# Patient Record
Sex: FEMALE | Race: BLACK OR AFRICAN AMERICAN | Employment: OTHER | URBAN - METROPOLITAN AREA
[De-identification: names, ages, dates, MRNs, and addresses within clinical notes are randomized per-mention and may not be internally consistent; named-entity substitution may affect disease eponyms.]

---

## 2018-03-16 ENCOUNTER — OFFICE VISIT (OUTPATIENT)
Dept: INTERNAL MEDICINE CLINIC | Age: 68
End: 2018-03-16

## 2018-03-16 VITALS
HEIGHT: 65 IN | RESPIRATION RATE: 18 BRPM | SYSTOLIC BLOOD PRESSURE: 153 MMHG | BODY MASS INDEX: 23.04 KG/M2 | HEART RATE: 111 BPM | OXYGEN SATURATION: 97 % | DIASTOLIC BLOOD PRESSURE: 79 MMHG | WEIGHT: 138.3 LBS | TEMPERATURE: 98.5 F

## 2018-03-16 DIAGNOSIS — E78.5 DYSLIPIDEMIA: ICD-10-CM

## 2018-03-16 DIAGNOSIS — I10 ESSENTIAL HYPERTENSION: Primary | ICD-10-CM

## 2018-03-16 DIAGNOSIS — E11.9 CONTROLLED TYPE 2 DIABETES MELLITUS WITHOUT COMPLICATION, WITHOUT LONG-TERM CURRENT USE OF INSULIN (HCC): ICD-10-CM

## 2018-03-16 DIAGNOSIS — R00.0 SINUS TACHYCARDIA: ICD-10-CM

## 2018-03-16 DIAGNOSIS — I34.1 MITRAL VALVE PROLAPSE: ICD-10-CM

## 2018-03-16 RX ORDER — GLUCOSAMINE SULFATE 1500 MG
1000 POWDER IN PACKET (EA) ORAL DAILY
COMMUNITY

## 2018-03-16 RX ORDER — LOSARTAN POTASSIUM AND HYDROCHLOROTHIAZIDE 12.5; 5 MG/1; MG/1
1 TABLET ORAL DAILY
Qty: 30 TAB | Refills: 11 | Status: SHIPPED | OUTPATIENT
Start: 2018-03-16 | End: 2019-04-25 | Stop reason: CLARIF

## 2018-03-16 RX ORDER — SIMVASTATIN 80 MG/1
80 TABLET, FILM COATED ORAL
COMMUNITY
End: 2018-03-16 | Stop reason: SDUPTHER

## 2018-03-16 RX ORDER — NIFEDIPINE 90 MG/1
90 TABLET, FILM COATED, EXTENDED RELEASE ORAL DAILY
COMMUNITY
End: 2018-03-16 | Stop reason: SDUPTHER

## 2018-03-16 RX ORDER — NIFEDIPINE 90 MG/1
90 TABLET, FILM COATED, EXTENDED RELEASE ORAL DAILY
Qty: 30 TAB | Refills: 11 | Status: SHIPPED | OUTPATIENT
Start: 2018-03-16 | End: 2019-04-10 | Stop reason: SDUPTHER

## 2018-03-16 RX ORDER — SIMVASTATIN 80 MG/1
80 TABLET, FILM COATED ORAL
Qty: 30 TAB | Refills: 11 | Status: SHIPPED | OUTPATIENT
Start: 2018-03-16 | End: 2019-05-16 | Stop reason: SDUPTHER

## 2018-03-16 RX ORDER — METFORMIN HYDROCHLORIDE 500 MG/1
500 TABLET ORAL 2 TIMES DAILY WITH MEALS
Qty: 60 TAB | Refills: 11 | Status: SHIPPED | OUTPATIENT
Start: 2018-03-16 | End: 2019-06-28 | Stop reason: CLARIF

## 2018-03-16 RX ORDER — ASPIRIN 81 MG/1
TABLET ORAL DAILY
COMMUNITY
End: 2019-11-24 | Stop reason: CLARIF

## 2018-03-16 RX ORDER — METFORMIN HYDROCHLORIDE 500 MG/1
500 TABLET ORAL 2 TIMES DAILY WITH MEALS
COMMUNITY
End: 2018-03-16 | Stop reason: SDUPTHER

## 2018-03-16 NOTE — PROGRESS NOTES
[address] [place] [address] and Primary Care  [place] [address]  [address] [facility] [address]  Phone:  [phone]  Fax: [phone]       Chief Complaint   Patient presents with    New Patient     Patient recently moved from [place], [place]. Hx of diabetes   . SUBJECTIVE:    Mary Farmer is a 79 y.o. female She comes in as a new patient. She has had hypertension since 2010. She has had no cardiovascular complications. She was diagnosed with diabetes roughly in 2010 also. Last hemoglobin A1C was less than 7. On the times when she did check blood sugars, they have been generally less than 125. She has been maintained on Metformin. She has never had a weight problem, but the maximum weight sustained being in the 150 range. Most recently, she was told that she had mitral valve prolapse. She stays quite physically active. Current Outpatient Prescriptions   Medication Sig Dispense Refill    aspirin delayed-release 81 mg tablet Take  by mouth daily.  cholecalciferol (VITAMIN D3) 1,000 unit cap Take 1,000 Units by mouth daily.  metFORMIN (GLUCOPHAGE) 500 mg tablet Take 1 Tab by mouth two (2) times daily (with meals). 60 Tab 11    NIFEdipine ER (ADALAT CC) 90 mg ER tablet Take 1 Tab by mouth daily. 30 Tab 11    simvastatin (ZOCOR) 80 mg tablet Take 1 Tab by mouth nightly. 30 Tab 11    losartan-hydroCHLOROthiazide (HYZAAR) 50-12.5 mg per tablet Take 1 Tab by mouth daily.  27 Tab 11     Past Medical History:   Diagnosis Date    Diabetes ([address]     [facility]    Hypercholesterolemia     Hypertension      Past Surgical History:   Procedure Laterality Date    BREAST SURGERY PROCEDURE UNLISTED Right 1998    Status post breast biopsy--- benign    [facility]  [facility]    [facility]     Allergies   Allergen Reactions    Lisinopril Swelling    Iodine Rash         REVIEW OF SYSTEMS:  General: negative for - chills or fever  ENT: negative for - headaches, nasal congestion or tinnitus  Respiratory: negative for - cough, hemoptysis, shortness of breath or wheezing  Cardiovascular : negative for - chest pain, edema, palpitations or shortness of breath  Gastrointestinal: negative for - abdominal pain, blood in stools, heartburn or nausea/vomiting  Genito-Urinary: no dysuria, trouble voiding, or hematuria  Musculoskeletal: negative for - gait disturbance, joint pain, joint stiffness or joint swelling  Neurological: no TIA or stroke symptoms  Hematologic: no bruises, no bleeding, no swollen glands  Integument: no lumps, mole changes, nail changes or rash  Endocrine: no malaise/lethargy or unexpected weight changes      Social History     Social History    Marital status: UNKNOWN     Spouse name: N/A    Number of children: N/A    Years of education: N/A     Social History Main Topics    Smoking status: Never Smoker    Smokeless tobacco: Never Used    Alcohol use Yes      Comment: socially    Drug use: No    Sexual activity: Yes     Partners: Male     Other Topics Concern    None     Social History Narrative     Family History   Problem Relation Age of Onset    Heart Disease Mother     Glaucoma Father        OBJECTIVE:    Visit Vitals    /79 (BP 1 Location: Left arm, BP Patient Position: Sitting)    Pulse (!) 111    Temp 98.5 °F (36.9 °C) (Oral)    Resp 18    Ht 5' 5\" (1.651 m)    Wt 138 lb 4.8 oz (62.7 kg)    SpO2 97%    BMI 23.01 kg/m2     CONSTITUTIONAL: well , well nourished, appears age appropriate  EYES: perrla, eom intact  ENMT:moist mucous membranes, pharynx clear  NECK: supple.  Thyroid normal, no bruits  RESPIRATORY: Chest: clear to ascultation and percussion   CARDIOVASCULAR: Heart: regular rate and rhythm, 1/6 systolic ejection murmur  GASTROINTESTINAL: Abdomen: soft, bowel sounds active  HEMATOLOGIC: no pathological lymph nodes palpated  MUSCULOSKELETAL: Extremities: no edema, pulse 1+   INTEGUMENT: No unusual rashes or suspicious skin lesions noted. Nails appear normal.  NEUROLOGIC: non-focal exam   MENTAL STATUS: alert and oriented, appropriate affect      ASSESSMENT:  1. Essential hypertension    2. Controlled type 2 diabetes mellitus without complication, without long-term current use of insulin ([facility]    3. Dyslipidemia    4. Mitral valve prolapse    5. Sinus tachycardia      Diagnoses and all orders for this visit:    1. Essential hypertension  -     NIFEdipine ER (ADALAT CC) 90 mg ER tablet; Take 1 Tab by mouth daily.  -     AMB POC EKG ROUTINE W/ 12 LEADS, INTER & REP  -     losartan-hydroCHLOROthiazide (HYZAAR) 50-12.5 mg per tablet; Take 1 Tab by mouth daily.  -     CBC WITH AUTOMATED DIFF  -     COLLECTION VENOUS BLOOD,VENIPUNCTURE  -     METABOLIC PANEL, COMPREHENSIVE  -     URINALYSIS W/ RFLX MICROSCOPIC    2. Controlled type 2 diabetes mellitus without complication, without long-term current use of insulin ([facility])  Assessment & Plan:  Stable, based on history, physical exam and review of pertinent labs, studies and medications; meds reconciled; lifestyle modifications recommended. Key Antihyperglycemic Medications             metFORMIN (GLUCOPHAGE) 500 mg tablet  (Taking) Take 1 Tab by mouth two (2) times daily (with meals). Other Key Diabetic Medications             simvastatin (ZOCOR) 80 mg tablet  (Taking) Take 1 Tab by mouth nightly. losartan-hydroCHLOROthiazide (HYZAAR) 50-12.5 mg per tablet  (Taking) Take 1 Tab by mouth daily. No results found for: HBA1C, LPX7XXAX, DOM8QADA, GLU, CREA, CREAPOC, CREATEXT, MALBUR, MCALPOCT, MCACRPOC, MALBCRRATEXT, MCACR, MCAU, MCAU2, MALBEXT, CHOL, CHOLPOCT, HDL, HDLPOC, LDLC, LDL, LDLCEXT, LDLCPOC, TRIGL, TGLPOCT, GEE4FNDH  Diabetic Foot and Eye Exam HM Status   Topic Date Due    Diabetic Foot Care  10/02/1960    Eye Exam  10/02/1960       Orders:  -     metFORMIN (GLUCOPHAGE) 500 mg tablet;  Take 1 Tab by mouth two (2) times daily (with meals). -     HEMOGLOBIN A1C WITH EAG  -     MICROALBUMIN, UR, RAND W/ MICROALB/CREAT RATIO    3. Dyslipidemia  -     simvastatin (ZOCOR) 80 mg tablet; Take 1 Tab by mouth nightly. -     APOLIPOPROTEIN B  -     LIPID PANEL  -     TSH 3RD GENERATION    4. Mitral valve prolapse    5. Sinus tachycardia        PLAN:    1. The patient's blood pressure is 148/80. I will make no adjustments since this is her first visit. She does have a rather impressive sinus tachycardia. This is probably a compensatory tachycardia related to use of the vasodilator in conjunction with increased sympathetic tone related to being anxious. She was told to get a blood pressure cuff, electronic, which will read not only her blood pressure, but her pulse rate and to write these down every other night. I will make a determination as to her actual blood pressure readings, as well as the degree of sympathetic tone that exists. 2. She is a diabetic and I will await the results of her hemoglobin A1C. No adjustments will be made. 3. She has history of hypercholesterolemia and is on Simvastatin 80 mg. This was obviously started years ago. No adjustment for now. 4. She does have a history of mitral valve prolapse via echocardiogram.  I will obtain the results of this study.         .  Orders Placed This Encounter    APOLIPOPROTEIN B    CBC WITH AUTOMATED DIFF    LIPID PANEL    METABOLIC PANEL, COMPREHENSIVE    TSH 3RD GENERATION    URINALYSIS W/ RFLX MICROSCOPIC    HEMOGLOBIN A1C WITH EAG    MICROALBUMIN, UR, RAND W/ MICROALB/CREAT RATIO    AMB POC EKG ROUTINE W/ 12 LEADS, INTER & REP    DISCONTD: metFORMIN (GLUCOPHAGE) 500 mg tablet    DISCONTD: simvastatin (ZOCOR) 80 mg tablet    DISCONTD: NIFEdipine ER (ADALAT CC) 90 mg ER tablet    DISCONTD: losartan 50 mg tab 50 mg, hydroCHLOROthiazide 12.5 mg cap 12.5 mg    aspirin delayed-release 81 mg tablet    cholecalciferol (VITAMIN D3) 1,000 unit cap    metFORMIN (GLUCOPHAGE) 500 mg tablet    NIFEdipine ER (ADALAT CC) 90 mg ER tablet    simvastatin (ZOCOR) 80 mg tablet    losartan-hydroCHLOROthiazide (HYZAAR) 50-12.5 mg per tablet         Follow-up Disposition:  Return in about 4 weeks (around 4/13/2018).       Oz Mcadams MD

## 2018-03-16 NOTE — PROGRESS NOTES
Chief Complaint   Patient presents with    New Patient     Patient recently moved from [place], [place]. Hx of diabetes     1. Have you been to the ER, urgent care clinic since your last visit? Hospitalized since your last visit? No    2. Have you seen or consulted any other health care providers outside of the [address] [place] [address] since your last visit? Include any pap smears or colon screening.  No\

## 2018-03-16 NOTE — MR AVS SNAPSHOT
[address] [place] [address] [place] [address] 
[phone] Patient: Mary Farmer MRN: [id number] ODE:91/7/2240 Visit Information Date & Time Provider Department Dept. Phone Encounter #  
 3/16/2018 11:00 AM [facility] [id number] Upcoming Health Maintenance Date Due Hepatitis C Screening 1950 DTaP/Tdap/Td series (1 - Tdap) 10/2/1971 BREAST CANCER SCRN MAMMOGRAM 10/2/2000 FOBT Q 1 YEAR AGE 50-75 10/2/2000 ZOSTER VACCINE AGE 60> 8/2/2010 GLAUCOMA SCREENING Q2Y 10/2/2015 Bone Densitometry (Dexa) Screening 10/2/2015 Pneumococcal 65+ Low/Medium Risk (1 of 2 - PCV13) 10/2/2015 MEDICARE YEARLY EXAM 10/2/2015 Influenza Age 5 to Adult 8/1/2017 Allergies as of 3/16/2018  Review Complete On: 3/16/2018 By: Del Tucker Severity Noted Reaction Type Reactions Lisinopril High 03/16/2018    Swelling Iodine  03/16/2018    Rash Current Immunizations  Never Reviewed No immunizations on file. Not reviewed this visit You Were Diagnosed With   
  
 Codes Comments Essential hypertension    -  Primary ICD-10-CM: I10 
ICD-9-CM: 401.9 Controlled type 2 diabetes mellitus without complication, without long-term current use of insulin ([facility]     ICD-10-CM: E11.9 ICD-9-CM: 250.00 Dyslipidemia     ICD-10-CM: E78.5 ICD-9-CM: 272.4 Mitral valve prolapse     ICD-10-CM: I34.1 ICD-9-CM: 424.0 Sinus tachycardia     ICD-10-CM: R00.0 ICD-9-CM: 427.89 Vitals BP Pulse Temp Resp Height(growth percentile) Weight(growth percentile) 153/79 (BP 1 Location: Left arm, BP Patient Position: Sitting) (!) 111 98.5 °F (36.9 °C) (Oral) 18 5' 5\" (1.651 m) 138 lb 4.8 oz (62.7 kg) SpO2 BMI OB Status Smoking Status 97% 23.01 kg/m2 Postmenopausal Never Smoker BMI and BSA Data Body Mass Index Body Surface Area 23.01 kg/m 2 1.7 m 2 [facility] Your Updated Medication List  
  
   
This list is accurate as of 3/16/18  2:21 PM.  Always use your most recent med list.  
  
  
  
  
 aspirin delayed-release 81 mg tablet Take  by mouth daily. losartan-hydroCHLOROthiazide 50-12.5 mg per tablet Commonly known as:  HYZAAR Take 1 Tab by mouth daily. metFORMIN 500 mg tablet Commonly known as:  GLUCOPHAGE Take 1 Tab by mouth two (2) times daily (with meals). NIFEdipine ER 90 mg ER tablet Commonly known as:  ADALAT CC Take 1 Tab by mouth daily. simvastatin 80 mg tablet Commonly known as:  ZOCOR Take 1 Tab by mouth nightly. VITAMIN D3 1,000 unit Cap Generic drug:  cholecalciferol Take 1,000 Units by mouth daily. Prescriptions Sent to Pharmacy Refills  
 metFORMIN (GLUCOPHAGE) 500 mg tablet 11 Sig: Take 1 Tab by mouth two (2) times daily (with meals). Class: Normal  
 Pharmacy: [place] [address]. Ph #: [phone] Route: Oral  
 NIFEdipine ER (ADALAT CC) 90 mg ER tablet 11 Sig: Take 1 Tab by mouth daily. Class: Normal  
 Pharmacy: [place] [address]. Ph #: [phone] Route: Oral  
 simvastatin (ZOCOR) 80 mg tablet 11 Sig: Take 1 Tab by mouth nightly. Class: Normal  
 Pharmacy: [place] [address]. Ph #: [phone] Route: Oral  
 losartan-hydroCHLOROthiazide (HYZAAR) 50-12.5 mg per tablet 11 Sig: Take 1 Tab by mouth daily. Class: Normal  
 Pharmacy: [place] [address]. Ph #: [phone]  Route: Oral  
  
 We Performed the Following AMB POC EKG ROUTINE W/ 12 LEADS, INTER & REP [40569 CPT(R)] APOLIPOPROTEIN B [id number] CPT(R)] CBC WITH AUTOMATED DIFF [14815 CPT(R)] COLLECTION VENOUS BLOOD,VENIPUNCTURE [id number] CPT(R)] HEMOGLOBIN A1C WITH EAG [67501 CPT(R)] LIPID PANEL [77114 CPT(R)] METABOLIC PANEL, COMPREHENSIVE [19313 CPT(R)] MICROALBUMIN, UR, RAND W/ MICROALB/CREAT RATIO [id number] CPT(R)] TSH 3RD GENERATION [10606 CPT(R)] URINALYSIS W/ RFLX MICROSCOPIC [08789 CPT(R)] Introducing [facility] & HEALTH SERVICES! Bogdan Narayan introduces [employer]Street patient portal. Now you can access parts of your medical record, email your doctor's office, and request medication refills online. 1. In your internet browser, go to [email or web address] 2. Click on the First Time User? Click Here link in the Sign In box. You will see the New Member Sign Up page. 3. Enter your [employer] Access Code exactly as it appears below. You will not need to use this code after youve completed the sign-up process. If you do not sign up before the expiration date, you must request a new code. · [employer] Access Code: [email or web address] Expires: 6/14/2018  2:21 PM 
 
4. Enter the last four digits of your Social Security Number (xxxx) and Date of Birth (mm/dd/yyyy) as indicated and click Submit. You will be taken to the next sign-up page. 5. Create a WomStreet ID. This will be your W[employer]Street login ID and cannot be changed, so think of one that is secure and easy to remember. 6. Create a W[employer]Street password. You can change your password at any time. 7. Enter your Password Reset Question and Answer. This can be used at a later time if you forget your password. 8. Enter your e-mail address. You will receive e-mail notification when new information is available in [address] 9. Click Sign Up. You can now view and download portions of your medical record. 10. Click the Download Summary menu link to download a portable copy of your medical information. If you have questions, please visit the Frequently Asked Questions section of the [employer] website. Remember, [employer] is NOT to be used for urgent needs. For medical emergencies, dial 911. Now available from your iPhone and Android! Please provide this summary of care documentation to your next provider. If you have any questions after today's visit, please call [phone].

## 2018-03-17 LAB
ALBUMIN SERPL-MCNC: 4.7 G/DL (ref 3.6–4.8)
ALBUMIN/CREAT UR: 265.5 MG/G CREAT (ref 0–30)
ALBUMIN/GLOB SERPL: 1.7 {RATIO} (ref 1.2–2.2)
ALP SERPL-CCNC: 42 IU/L (ref 39–117)
ALT SERPL-CCNC: 10 IU/L (ref 0–32)
APO B SERPL-MCNC: 67 MG/DL (ref 54–133)
APPEARANCE UR: CLEAR
AST SERPL-CCNC: 17 IU/L (ref 0–40)
BASOPHILS # BLD AUTO: 0 X10E3/UL (ref 0–0.2)
BASOPHILS NFR BLD AUTO: 1 %
BILIRUB SERPL-MCNC: 0.6 MG/DL (ref 0–1.2)
BILIRUB UR QL STRIP: NEGATIVE
BUN SERPL-MCNC: 10 MG/DL (ref 8–27)
BUN/CREAT SERPL: 15 (ref 12–28)
CALCIUM SERPL-MCNC: 9.9 MG/DL (ref 8.7–10.3)
CHLORIDE SERPL-SCNC: 99 MMOL/L (ref 96–106)
CHOLEST SERPL-MCNC: 149 MG/DL (ref 100–199)
CO2 SERPL-SCNC: 25 MMOL/L (ref 18–29)
COLOR UR: YELLOW
CREAT SERPL-MCNC: 0.67 MG/DL (ref 0.57–1)
CREAT UR-MCNC: 20.6 MG/DL
EOSINOPHIL # BLD AUTO: 0.1 X10E3/UL (ref 0–0.4)
EOSINOPHIL NFR BLD AUTO: 1 %
ERYTHROCYTE [DISTWIDTH] IN BLOOD BY AUTOMATED COUNT: 18.4 % (ref 12.3–15.4)
EST. AVERAGE GLUCOSE BLD GHB EST-MCNC: 143 MG/DL
GFR SERPLBLD CREATININE-BSD FMLA CKD-EPI: 105 ML/MIN/1.73
GFR SERPLBLD CREATININE-BSD FMLA CKD-EPI: 91 ML/MIN/1.73
GLOBULIN SER CALC-MCNC: 2.7 G/DL (ref 1.5–4.5)
GLUCOSE SERPL-MCNC: 91 MG/DL (ref 65–99)
GLUCOSE UR QL: NEGATIVE
HBA1C MFR BLD: 6.6 % (ref 4.8–5.6)
HCT VFR BLD AUTO: 31.7 % (ref 34–46.6)
HDLC SERPL-MCNC: 73 MG/DL
HGB BLD-MCNC: 9.6 G/DL (ref 11.1–15.9)
HGB UR QL STRIP: NEGATIVE
IMM GRANULOCYTES # BLD: 0 X10E3/UL (ref 0–0.1)
IMM GRANULOCYTES NFR BLD: 0 %
KETONES UR QL STRIP: NEGATIVE
LDLC SERPL CALC-MCNC: 64 MG/DL (ref 0–99)
LEUKOCYTE ESTERASE UR QL STRIP: NEGATIVE
LYMPHOCYTES # BLD AUTO: 2.5 X10E3/UL (ref 0.7–3.1)
LYMPHOCYTES NFR BLD AUTO: 33 %
MCH RBC QN AUTO: 22.1 PG (ref 26.6–33)
MCHC RBC AUTO-ENTMCNC: 30.3 G/DL (ref 31.5–35.7)
MCV RBC AUTO: 73 FL (ref 79–97)
MICRO URNS: NORMAL
MICROALBUMIN UR-MCNC: 54.7 UG/ML
MONOCYTES # BLD AUTO: 0.6 X10E3/UL (ref 0.1–0.9)
MONOCYTES NFR BLD AUTO: 8 %
NEUTROPHILS # BLD AUTO: 4.3 X10E3/UL (ref 1.4–7)
NEUTROPHILS NFR BLD AUTO: 57 %
NITRITE UR QL STRIP: NEGATIVE
PH UR STRIP: 6.5 [PH] (ref 5–7.5)
PLATELET # BLD AUTO: 499 X10E3/UL (ref 150–379)
POTASSIUM SERPL-SCNC: 4.1 MMOL/L (ref 3.5–5.2)
PROT SERPL-MCNC: 7.4 G/DL (ref 6–8.5)
PROT UR QL STRIP: NEGATIVE
RBC # BLD AUTO: 4.35 X10E6/UL (ref 3.77–5.28)
SODIUM SERPL-SCNC: 143 MMOL/L (ref 134–144)
SP GR UR: 1.01 (ref 1–1.03)
TRIGL SERPL-MCNC: 59 MG/DL (ref 0–149)
TSH SERPL DL<=0.005 MIU/L-ACNC: 0.9 UIU/ML (ref 0.45–4.5)
UROBILINOGEN UR STRIP-MCNC: 0.2 MG/DL (ref 0.2–1)
VLDLC SERPL CALC-MCNC: 12 MG/DL (ref 5–40)
WBC # BLD AUTO: 7.5 X10E3/UL (ref 3.4–10.8)

## 2018-03-19 ENCOUNTER — TELEPHONE (OUTPATIENT)
Dept: INTERNAL MEDICINE CLINIC | Age: 68
End: 2018-03-19

## 2018-03-19 NOTE — TELEPHONE ENCOUNTER
Per Dr. Zev Jordan patient notified by phone and ask to return for some additional labs because of low hemoglobin. Dr. Zev Jordan asking that she return for a TIBC, FERRITIN, SPE,FRACTIONATED HEMOGLOBIN,AND REPEAT CBC. Patient states she live in [place] and she will have to fin a ride. She will call me when she can come in.

## 2018-03-23 ENCOUNTER — LAB ONLY (OUTPATIENT)
Dept: INTERNAL MEDICINE CLINIC | Age: 68
End: 2018-03-23

## 2018-03-23 DIAGNOSIS — D64.9 ANEMIA, UNSPECIFIED TYPE: Primary | ICD-10-CM

## 2018-03-23 NOTE — MR AVS SNAPSHOT
[facility] 
 
 
 [place] [address] [address] 
[phone] Patient: Mary Farmer MRN: [id number] TRN:14/7/[birth year]473 Visit Information Date & Time Provider Department Dept. Phone Encounter #  
 3/23/2018 11:00 AM [facility] [address] [place] [address] [phone] [id number] Your Appointments 4/20/2018 12:00 PM  
Any with Oz Mcadams MD  
[address] [place] [address] and Primary Care [facility]) Appt Note: 1 month follow up  
 [place] [address] [facility]  
  
   
 [place] [address] Upcoming Health Maintenance Date Due Hepatitis C Screening 1950 FOOT EXAM Q1 10/2/1960 EYE EXAM RETINAL OR DILATED Q1 10/2/1960 DTaP/Tdap/Td series (1 - Tdap) 10/2/1971 BREAST CANCER SCRN MAMMOGRAM 10/2/2000 FOBT Q 1 YEAR AGE 50-75 10/2/2000 ZOSTER VACCINE AGE 60> 8/2/2010 GLAUCOMA SCREENING Q2Y 10/2/2015 Bone Densitometry (Dexa) Screening 10/2/2015 Pneumococcal 65+ Low/Medium Risk (1 of 2 - PCV13) 10/2/2015 Influenza Age 5 to Adult 8/1/2017 HEMOGLOBIN A1C Q6M 9/16/2018 MICROALBUMIN Q1 3/16/2019 LIPID PANEL Q1 3/16/2019 Allergies as of 3/23/2018  Review Complete On: 3/17/2018 By: Oz Mcadams MD  
  
 Severity Noted Reaction Type Reactions Lisinopril High 03/16/2018    Swelling Iodine  03/16/2018    Rash Current Immunizations  Never Reviewed No immunizations on file. Not reviewed this visit You Were Diagnosed With   
  
 Codes Comments Anemia, unspecified type    -  Primary ICD-10-CM: D64.9 ICD-9-CM: 151. 9 Vitals OB Status Smoking Status Postmenopausal Never Smoker Preferred Pharmacy Pharmacy Name Phone [facility], [place] [address] Your Updated Medication List  
  
   
 This list is accurate as of 3/23/18  1:23 PM.  Always use your most recent med list.  
  
  
  
  
 aspirin delayed-release 81 mg tablet Take  by mouth daily. losartan-hydroCHLOROthiazide 50-12.5 mg per tablet Commonly known as:  HYZAAR Take 1 Tab by mouth daily. metFORMIN 500 mg tablet Commonly known as:  GLUCOPHAGE Take 1 Tab by mouth two (2) times daily (with meals). NIFEdipine ER 90 mg ER tablet Commonly known as:  ADALAT CC Take 1 Tab by mouth daily. simvastatin 80 mg tablet Commonly known as:  ZOCOR Take 1 Tab by mouth nightly. VITAMIN D3 1,000 unit Cap Generic drug:  cholecalciferol Take 1,000 Units by mouth daily. We Performed the Following CBC WITH AUTOMATED DIFF [01062 CPT(R)] Comments:  
 Per providers verbal orders FERRITIN [69311 CPT(R)] Comments:  
 Per providers verbal orders HEMOGLOBIN FRACTIONATION [CFZ32013 Custom] Comments:  
 Per providers verbal orders IRON PROFILE [id number] CPT(R)] Comments:  
 Per providers verbal orders PROTEIN ELECTROPHORESIS [36279 CPT(R)] Comments:  
 Per providers verbal orders Introducing \[facility]\"" & HEALTH SERVICES! [facility] introduces [employer]ia patient portal. Now you can access parts of your medical record, email your doctor's office, and request medication refills online. 1. In your internet browser, go to [email or web address] "[email or web address]/[email or web address] 2. Click on the First Time User? Click Here link in the Sign In box. You will see the New Member Sign Up page. 3. Enter your Xuanyixia Access Code exactly as it appears below. You will not need to use this code after youve completed the sign-up process. If you do not sign up before the expiration date, you must request a new code. · Xuany[employer]ia Access Code: [email or web address] Expires: 6/14/2018  2:21 PM 
 
4.  Enter the last four digits of your Social Security Number (xxxx) and Date of Birth (mm/dd/yyyy) as indicated and click Submit. You will be taken to the next sign-up page. 5. Create a Minded ID. This will be your Minded login ID and cannot be changed, so think of one that is secure and easy to remember. 6. Create a Minded password. You can change your password at any time. 7. Enter your Password Reset Question and Answer. This can be used at a later time if you forget your password. 8. Enter your e-mail address. You will receive e-mail notification when new information is available in [address] 9. Click Sign Up. You can now view and download portions of your medical record. 10. Click the Download Summary menu link to download a portable copy of your medical information. If you have questions, please visit the Frequently Asked Questions section of the Minded website. Remember, Minded is NOT to be used for urgent needs. For medical emergencies, dial 911. Now available from your iPhone and Android! Please provide this summary of care documentation to your next provider. If you have any questions after today's visit, please call [phone].

## 2018-03-27 LAB
ALBUMIN SERPL ELPH-MCNC: 4.2 G/DL (ref 2.9–4.4)
ALBUMIN/GLOB SERPL: 1.2 {RATIO} (ref 0.7–1.7)
ALPHA1 GLOB SERPL ELPH-MCNC: 0.2 G/DL (ref 0–0.4)
ALPHA2 GLOB SERPL ELPH-MCNC: 0.9 G/DL (ref 0.4–1)
B-GLOBULIN SERPL ELPH-MCNC: 1.2 G/DL (ref 0.7–1.3)
BASOPHILS # BLD AUTO: 0.1 X10E3/UL (ref 0–0.2)
BASOPHILS NFR BLD AUTO: 1 %
EOSINOPHIL # BLD AUTO: 0.3 X10E3/UL (ref 0–0.4)
EOSINOPHIL NFR BLD AUTO: 4 %
ERYTHROCYTE [DISTWIDTH] IN BLOOD BY AUTOMATED COUNT: 19.3 % (ref 12.3–15.4)
FERRITIN SERPL-MCNC: 8 NG/ML (ref 15–150)
GAMMA GLOB SERPL ELPH-MCNC: 1.2 G/DL (ref 0.4–1.8)
GLOBULIN SER CALC-MCNC: 3.5 G/DL (ref 2.2–3.9)
HCT VFR BLD AUTO: 31.4 % (ref 34–46.6)
HGB A MFR BLD: 98.1 % (ref 96.4–98.8)
HGB A2 MFR BLD COLUMN CHROM: 1.9 % (ref 1.8–3.2)
HGB BLD-MCNC: 9.7 G/DL (ref 11.1–15.9)
HGB C MFR BLD: 0 %
HGB F MFR BLD: 0 % (ref 0–2)
HGB FRACT BLD-IMP: NORMAL
HGB OTHER MFR BLD HPLC: 0 %
HGB S BLD QL SOLY: NEGATIVE
HGB S MFR BLD: 0 %
IMM GRANULOCYTES # BLD: 0 X10E3/UL (ref 0–0.1)
IMM GRANULOCYTES NFR BLD: 0 %
IRON SATN MFR SERPL: 7 % (ref 15–55)
IRON SERPL-MCNC: 31 UG/DL (ref 27–139)
LYMPHOCYTES # BLD AUTO: 3 X10E3/UL (ref 0.7–3.1)
LYMPHOCYTES NFR BLD AUTO: 36 %
M PROTEIN SERPL ELPH-MCNC: NORMAL G/DL
MCH RBC QN AUTO: 22 PG (ref 26.6–33)
MCHC RBC AUTO-ENTMCNC: 30.9 G/DL (ref 31.5–35.7)
MCV RBC AUTO: 71 FL (ref 79–97)
MONOCYTES # BLD AUTO: 0.6 X10E3/UL (ref 0.1–0.9)
MONOCYTES NFR BLD AUTO: 7 %
NEUTROPHILS # BLD AUTO: 4.3 X10E3/UL (ref 1.4–7)
NEUTROPHILS NFR BLD AUTO: 52 %
PLATELET # BLD AUTO: 488 X10E3/UL (ref 150–379)
PLEASE NOTE, 011150: NORMAL
PROT SERPL-MCNC: 7.7 G/DL (ref 6–8.5)
RBC # BLD AUTO: 4.4 X10E6/UL (ref 3.77–5.28)
TIBC SERPL-MCNC: 462 UG/DL (ref 250–450)
UIBC SERPL-MCNC: 431 UG/DL (ref 118–369)
WBC # BLD AUTO: 8.2 X10E3/UL (ref 3.4–10.8)

## 2018-04-01 NOTE — PROGRESS NOTES
Tell patient she is iron deficient and needs to see a gastroenterologist--------- which she like to see one up here or near her home in [place]

## 2018-04-20 ENCOUNTER — OFFICE VISIT (OUTPATIENT)
Dept: INTERNAL MEDICINE CLINIC | Age: 68
End: 2018-04-20

## 2018-04-20 VITALS
WEIGHT: 138.5 LBS | RESPIRATION RATE: 16 BRPM | TEMPERATURE: 98.1 F | HEART RATE: 115 BPM | BODY MASS INDEX: 23.07 KG/M2 | HEIGHT: 65 IN | DIASTOLIC BLOOD PRESSURE: 69 MMHG | SYSTOLIC BLOOD PRESSURE: 151 MMHG | OXYGEN SATURATION: 97 %

## 2018-04-20 DIAGNOSIS — I10 ESSENTIAL HYPERTENSION: ICD-10-CM

## 2018-04-20 DIAGNOSIS — D50.0 IRON DEFICIENCY ANEMIA DUE TO CHRONIC BLOOD LOSS: Primary | ICD-10-CM

## 2018-04-20 DIAGNOSIS — E11.9 CONTROLLED TYPE 2 DIABETES MELLITUS WITHOUT COMPLICATION, WITHOUT LONG-TERM CURRENT USE OF INSULIN (HCC): ICD-10-CM

## 2018-04-20 DIAGNOSIS — E78.5 DYSLIPIDEMIA: ICD-10-CM

## 2018-04-20 NOTE — MR AVS SNAPSHOT
[address] [place] [address]. [place] [address] 
[phone] Patient: Mary Farmer MRN: [id number] GRW:[birth year] Visit Information Date & Time Provider Department Dept. Phone Encounter #  
 4/20/2018 12:00 PM Yevgeniy Ortiz MD [facility] Sports Medicine and Primary Care [id number] Upcoming Health Maintenance Date Due Hepatitis C Screening 1950 FOOT EXAM Q1 10/2/1960 DTaP/Tdap/Td series (1 - Tdap) 10/2/1971 FOBT Q 1 YEAR AGE 50-75 10/2/2000 ZOSTER VACCINE AGE 60> 8/2/2010 GLAUCOMA SCREENING Q2Y 10/2/2015 Bone Densitometry (Dexa) Screening 10/2/2015 Influenza Age 5 to Adult 8/1/2017 Pneumococcal 65+ Low/Medium Risk (1 of 2 - PCV13) 5/20/2018* HEMOGLOBIN A1C Q6M 9/16/2018 MICROALBUMIN Q1 3/16/2019 LIPID PANEL Q1 3/16/2019 BREAST CANCER SCRN MAMMOGRAM 7/20/2019 *Topic was postponed. The date shown is not the original due date. Allergies as of 4/20/2018  Review Complete On: 4/20/2018 By: Katelin Hsieh Severity Noted Reaction Type Reactions Lisinopril High 03/16/2018    Swelling Iodine  03/16/2018    Rash Current Immunizations  Never Reviewed No immunizations on file. Not reviewed this visit Vitals BP Pulse Temp Resp Height(growth percentile) Weight(growth percentile) 151/69 (BP 1 Location: Left arm, BP Patient Position: Sitting) (!) 115 98.1 °F (36.7 °C) (Oral) 16 5' 5\" (1.651 m) 138 lb 8 oz (62.8 kg) SpO2 BMI OB Status Smoking Status 97% 23.05 kg/m2 Postmenopausal Never Smoker Vitals History BMI and BSA Data Body Mass Index Body Surface Area 23.05 kg/m 2 1.7 m 2 Preferred Pharmacy Pharmacy Name Phone [address] [place] [address] Your Updated Medication List  
  
   
 This list is accurate as of 4/20/18  1:59 PM.  Always use your most recent med list.  
  
  
  
  
 aspirin delayed-release 81 mg tablet Take  by mouth daily. losartan-hydroCHLOROthiazide 50-12.5 mg per tablet Commonly known as:  HYZAAR Take 1 Tab by mouth daily. metFORMIN 500 mg tablet Commonly known as:  GLUCOPHAGE Take 1 Tab by mouth two (2) times daily (with meals). NIFEdipine ER 90 mg ER tablet Commonly known as:  ADALAT CC Take 1 Tab by mouth daily. simvastatin 80 mg tablet Commonly known as:  ZOCOR Take 1 Tab by mouth nightly. VITAMIN D3 1,000 unit Cap Generic drug:  cholecalciferol Take 1,000 Units by mouth daily. Introducing [facility] & HEALTH SERVICES! Stephanie Parkinson introduces [employer] patient portal. Now you can access parts of your medical record, email your doctor's office, and request medication refills online. 1. In your internet browser, go to [email or web address] 2. Click on the First Time User? Click Here link in the Sign In box. You will see the New Member Sign Up page. 3. Enter your SocialVolt Access Code exactly as it appears below. You will not need to use this code after youve completed the sign-up process. If you do not sign up before the expiration date, you must request a new code. · SocialVolt Access Code: [email or web address] Expires: 6/14/2018  2:21 PM 
 
4. Enter the last four digits of your Social Security Number (xxxx) and Date of Birth (mm/dd/yyyy) as indicated and click Submit. You will be taken to the next sign-up page. 5. Create a Oree Advanced Illumination Solutionst ID. This will be your SocialVolt login ID and cannot be changed, so think of one that is secure and easy to remember. 6. Create a SocialVolt password. You can change your password at any time. 7. Enter your Password Reset Question and Answer. This can be used at a later time if you forget your password. 8. Enter your e-mail address.  You will receive e-mail notification when new information is available in [employer]. 9. Click Sign Up. You can now view and download portions of your medical record. 10. Click the Download Summary menu link to download a portable copy of your medical information. If you have questions, please visit the Frequently Asked Questions section of the [employer] website. Remember, MOOVIA is NOT to be used for urgent needs. For medical emergencies, dial 911. Now available from your iPhone and Android! Please provide this summary of care documentation to your next provider. If you have any questions after today's visit, please call [phone].

## 2018-04-21 PROBLEM — D50.0 IRON DEFICIENCY ANEMIA DUE TO CHRONIC BLOOD LOSS: Status: ACTIVE | Noted: 2018-04-21

## 2018-04-21 NOTE — PROGRESS NOTES
[address] [place] [address] and Primary Care  [facility]  [address] [facility] [address]  Phone:  [phone]  Fax: [phone]       Chief Complaint   Patient presents with    Hypertension     1 month follow up    . SUBJECTIVE:    Mary Farmer is a 79 y.o. female comes in for follow-up. Her lab work indicates iron deficiency anemia which was confirmed with iron studies. In spite of the fact that she had a colonoscopy done in 2015, she will have to have a repeat upper and lower endoscopy as well as a capsule study. There has been no history of rectal bleeding. She also took her pulse rate since I last saw her. Her average heart rate is between 80 and 85. This indicates a baseline increased sympathetic tone. The question is should I address this with use of a beta blocker. Her diabetes is doing quite well based on her last hemoglobin A1c. She has been taking her statin as prescribed with no adverse effects. Current Outpatient Prescriptions   Medication Sig Dispense Refill    aspirin delayed-release 81 mg tablet Take  by mouth daily.  cholecalciferol (VITAMIN D3) 1,000 unit cap Take 1,000 Units by mouth daily.  metFORMIN (GLUCOPHAGE) 500 mg tablet Take 1 Tab by mouth two (2) times daily (with meals). 60 Tab 11    NIFEdipine ER (ADALAT CC) 90 mg ER tablet Take 1 Tab by mouth daily. 30 Tab 11    simvastatin (ZOCOR) 80 mg tablet Take 1 Tab by mouth nightly. 30 Tab 11    losartan-hydroCHLOROthiazide (HYZAAR) 50-12.5 mg per tablet Take 1 Tab by mouth daily.  27 Tab 11     Past Medical History:   Diagnosis Date    Diabetes ([address]     [facility] 2017    [facility]    Hypercholesterolemia     Hypertension      Past Surgical History:   Procedure Laterality Date    BREAST SURGERY PROCEDURE UNLISTED Right 1998    Status post breast biopsy--- benign    HX COLONOSCOPY  2015    normal--- [facility]     Allergies   Allergen Reactions    Lisinopril Swelling    Iodine Rash         REVIEW OF SYSTEMS:  General: negative for - chills or fever  ENT: negative for - headaches, nasal congestion or tinnitus  Respiratory: negative for - cough, hemoptysis, shortness of breath or wheezing  Cardiovascular : negative for - chest pain, edema, palpitations or shortness of breath  Gastrointestinal: negative for - abdominal pain, blood in stools, heartburn or nausea/vomiting  Genito-Urinary: no dysuria, trouble voiding, or hematuria  Musculoskeletal: negative for - gait disturbance, joint pain, joint stiffness or joint swelling  Neurological: no TIA or stroke symptoms  Hematologic: no bruises, no bleeding, no swollen glands  Integument: no lumps, mole changes, nail changes or rash  Endocrine: no malaise/lethargy or unexpected weight changes      Social History     Social History    Marital status: UNKNOWN     Spouse name: N/A    Number of children: N/A    Years of education: N/A     Social History Main Topics    Smoking status: Never Smoker    Smokeless tobacco: Never Used    Alcohol use Yes      Comment: socially    Drug use: No    Sexual activity: Yes     Partners: Male     Other Topics Concern    None     Social History Narrative     Family History   Problem Relation Age of Onset    Heart Disease Mother     Glaucoma Father        OBJECTIVE:    Visit Vitals    /69 (BP 1 Location: Left arm, BP Patient Position: Sitting)    Pulse (!) 115    Temp 98.1 °F (36.7 °C) (Oral)    Resp 16    Ht 5' 5\" (1.651 m)    Wt 138 lb 8 oz (62.8 kg)    SpO2 97%    BMI 23.05 kg/m2     CONSTITUTIONAL: well , well nourished, appears age appropriate  EYES: perrla, eom intact  ENMT:moist mucous membranes, pharynx clear  NECK: supple.  Thyroid normal  RESPIRATORY: Chest: clear to ascultation and percussion   CARDIOVASCULAR: Heart: regular rate and rhythm  GASTROINTESTINAL: Abdomen: soft, bowel sounds active  HEMATOLOGIC: no pathological lymph nodes palpated  MUSCULOSKELETAL: Extremities: no edema, pulse 1+   INTEGUMENT: No unusual rashes or suspicious skin lesions noted. Nails appear normal.  NEUROLOGIC: non-focal exam   MENTAL STATUS: alert and oriented, appropriate affect      ASSESSMENT:  1. Iron deficiency anemia due to chronic blood loss    2. Essential hypertension    3. Controlled type 2 diabetes mellitus without complication, without long-term current use of insulin ([address]    4. Dyslipidemia        PLAN:    1. The patient will be referred to a gastroenterologist in [facility] or [facility], for workup of the ion deficiency anemia. 2. Blood pressure is excellent today, no adjustments are made. 3. Her diabetes is also well controlled based on her last hemoglobin A1c.    4. She will continue her statin as prescribed. 5. She has significantly increased sympathetic tone which is obviously more exaggerated in the office as has been the case previously. Her heart rate as I stated earlier, average heart rate at home was between 80 and 85. No intervention with a beta blocker for now. 6. She also wants to see an ophthalmologist as well as a dentist.              Follow-up Disposition:  Return in about 4 months (around 8/20/2018).       Maricruz Hernandez MD

## 2018-08-20 ENCOUNTER — OFFICE VISIT (OUTPATIENT)
Dept: INTERNAL MEDICINE CLINIC | Age: 68
End: 2018-08-20

## 2018-08-20 VITALS
DIASTOLIC BLOOD PRESSURE: 72 MMHG | OXYGEN SATURATION: 98 % | SYSTOLIC BLOOD PRESSURE: 153 MMHG | HEIGHT: 65 IN | HEART RATE: 102 BPM | TEMPERATURE: 98.5 F | WEIGHT: 135.4 LBS | RESPIRATION RATE: 16 BRPM | BODY MASS INDEX: 22.56 KG/M2

## 2018-08-20 DIAGNOSIS — E11.9 CONTROLLED TYPE 2 DIABETES MELLITUS WITHOUT COMPLICATION, WITHOUT LONG-TERM CURRENT USE OF INSULIN (HCC): ICD-10-CM

## 2018-08-20 DIAGNOSIS — D50.0 IRON DEFICIENCY ANEMIA DUE TO CHRONIC BLOOD LOSS: ICD-10-CM

## 2018-08-20 DIAGNOSIS — E78.5 DYSLIPIDEMIA: ICD-10-CM

## 2018-08-20 DIAGNOSIS — K21.00 REFLUX ESOPHAGITIS: Primary | ICD-10-CM

## 2018-08-20 DIAGNOSIS — I10 ESSENTIAL HYPERTENSION: ICD-10-CM

## 2018-08-20 NOTE — PROGRESS NOTES
Chief Complaint   Patient presents with   [address] [facility] [address] Annual Wellness Visit     1. Have you been to the ER, urgent care clinic since your last visit? Hospitalized since your last visit? Yes When: 7/9/18 Where: [facility] Reason for visit: endoscopy (problem)    2. Have you seen or consulted any other health care providers outside of the [facility] since your last visit? Include any pap smears or colon screening.  No

## 2018-08-20 NOTE — MR AVS SNAPSHOT
[address] [place] [address] [place] [id number] 
[phone] Patient: Mary Farmer MRN: [id number] Visit Information Date & Time Provider Department Dept. Phone Encounter #  
 8/20/2018 12:30 PM Abdulkadir Paredes MD [facility] and Primary Care [phone] [id number] Upcoming Health Maintenance Date Due Hepatitis C Screening 1950 DTaP/Tdap/Td series (1 - Tdap) 10/2/1971 FOBT Q 1 YEAR AGE 50-75 10/2/2000 ZOSTER VACCINE AGE 60> 8/2/2010 GLAUCOMA SCREENING Q2Y 10/2/2015 Bone Densitometry (Dexa) Screening 10/2/2015 Influenza Age 5 to Adult 8/1/2018 HEMOGLOBIN A1C Q6M 9/16/2018 Pneumococcal 65+ Low/Medium Risk (1 of 2 - PCV13) 9/20/2018* FOOT EXAM Q1 9/20/2018* MICROALBUMIN Q1 3/16/2019 LIPID PANEL Q1 3/16/2019 BREAST CANCER SCRN MAMMOGRAM 7/20/2019 *Topic was postponed. The date shown is not the original due date. Allergies as of 8/20/2018  Review Complete On: 8/20/2018 By: Nany Crawford Severity Noted Reaction Type Reactions Lisinopril High 03/16/2018    Swelling Iodine  03/16/2018    Rash Current Immunizations  Never Reviewed No immunizations on file. Not reviewed this visit You Were Diagnosed With   
  
 Codes Comments Controlled type 2 diabetes mellitus without complication, without long-term current use of insulin ([address]    -  Primary ICD-10-CM: E11.9 ICD-9-CM: 250.00 Iron deficiency anemia due to chronic blood loss     ICD-10-CM: D50.0 ICD-9-CM: 280.0 Essential hypertension     ICD-10-CM: I10 
ICD-9-CM: 401.9 Dyslipidemia     ICD-10-CM: E78.5 ICD-9-CM: 272.4 Vitals BP Pulse Temp Resp Height(growth percentile) Weight(growth percentile) 153/72 (!) 102 98.5 °F (36.9 °C) (Oral) 16 5' 5\" (1.651 m) 135 lb 6.4 oz (61.4 kg) SpO2 BMI OB Status Smoking Status 98% 22.53 kg/m2 Postmenopausal Never Smoker Vitals History BMI and BSA Data Body Mass Index Body Surface Area  
 22.53 kg/m 2 1.68 m 2 Preferred Pharmacy Pharmacy Name Phone [facility] Your Updated Medication List  
  
   
This list is accurate as of 8/20/18  2:17 PM.  Always use your most recent med list.  
  
  
  
  
 aspirin delayed-release 81 mg tablet Take  by mouth daily. losartan-hydroCHLOROthiazide 50-12.5 mg per tablet Commonly known as:  HYZAAR Take 1 Tab by mouth daily. metFORMIN 500 mg tablet Commonly known as:  GLUCOPHAGE Take 1 Tab by mouth two (2) times daily (with meals). NIFEdipine ER 90 mg ER tablet Commonly known as:  ADALAT CC Take 1 Tab by mouth daily. simvastatin 80 mg tablet Commonly known as:  ZOCOR Take 1 Tab by mouth nightly. VITAMIN D3 1,000 unit Cap Generic drug:  cholecalciferol Take 1,000 Units by mouth daily. We Performed the Following CBC WITH AUTOMATED DIFF [53461 CPT(R)] HEMOGLOBIN A1C WITH EAG [16936 CPT(R)] METABOLIC PANEL, BASIC [26947 CPT(R)] Introducing [facility]! [facility] introduces bigtincan patient portal. Now you can access parts of your medical record, email your doctor's office, and request medication refills online. 1. In your internet browser, go to [email or web address] 2. Click on the First Time User? Click Here link in the Sign In box. You will see the New Member Sign Up page. 3. Enter your bigtincan Access Code exactly as it appears below. You will not need to use this code after youve completed the sign-up process. If you do not sign up before the expiration date, you must request a new code. · bigtincan Access Code: [email or web address] Expires: 11/18/2018  1:03 PM 
 
 4. Enter the last four digits of your Social Security Number (xxxx) and Date of Birth (mm/dd/yyyy) as indicated and click Submit. You will be taken to the next sign-up page. 5. Create a BiOM ID. This will be your BiOM login ID and cannot be changed, so think of one that is secure and easy to remember. 6. Create a BiOM password. You can change your password at any time. 7. Enter your Password Reset Question and Answer. This can be used at a later time if you forget your password. 8. Enter your e-mail address. You will receive e-mail notification when new information is available in [address] 9. Click Sign Up. You can now view and download portions of your medical record. 10. Click the Download Summary menu link to download a portable copy of your medical information. If you have questions, please visit the Frequently Asked Questions section of the BiOM website. Remember, BiOM is NOT to be used for urgent needs. For medical emergencies, dial 911. Now available from your iPhone and Android! Please provide this summary of care documentation to your next provider. Your primary care clinician is listed as Geo Corbin. If you have any questions after today's visit, please call [phone].

## 2018-08-21 LAB
BASOPHILS # BLD AUTO: 0 X10E3/UL (ref 0–0.2)
BASOPHILS NFR BLD AUTO: 0 %
BUN SERPL-MCNC: 7 MG/DL (ref 8–27)
BUN/CREAT SERPL: 9 (ref 12–28)
CALCIUM SERPL-MCNC: 9.9 MG/DL (ref 8.7–10.3)
CHLORIDE SERPL-SCNC: 102 MMOL/L (ref 96–106)
CO2 SERPL-SCNC: 21 MMOL/L (ref 20–29)
CREAT SERPL-MCNC: 0.74 MG/DL (ref 0.57–1)
EOSINOPHIL # BLD AUTO: 0.2 X10E3/UL (ref 0–0.4)
EOSINOPHIL NFR BLD AUTO: 2 %
ERYTHROCYTE [DISTWIDTH] IN BLOOD BY AUTOMATED COUNT: 18.6 % (ref 12.3–15.4)
EST. AVERAGE GLUCOSE BLD GHB EST-MCNC: 137 MG/DL
GLUCOSE SERPL-MCNC: 91 MG/DL (ref 65–99)
HBA1C MFR BLD: 6.4 % (ref 4.8–5.6)
HCT VFR BLD AUTO: 40.7 % (ref 34–46.6)
HGB BLD-MCNC: 13 G/DL (ref 11.1–15.9)
IMM GRANULOCYTES # BLD: 0 X10E3/UL (ref 0–0.1)
IMM GRANULOCYTES NFR BLD: 0 %
LYMPHOCYTES # BLD AUTO: 2.1 X10E3/UL (ref 0.7–3.1)
LYMPHOCYTES NFR BLD AUTO: 31 %
MCH RBC QN AUTO: 26.5 PG (ref 26.6–33)
MCHC RBC AUTO-ENTMCNC: 31.9 G/DL (ref 31.5–35.7)
MCV RBC AUTO: 83 FL (ref 79–97)
MONOCYTES # BLD AUTO: 0.4 X10E3/UL (ref 0.1–0.9)
MONOCYTES NFR BLD AUTO: 6 %
NEUTROPHILS # BLD AUTO: 4 X10E3/UL (ref 1.4–7)
NEUTROPHILS NFR BLD AUTO: 61 %
PLATELET # BLD AUTO: 376 X10E3/UL (ref 150–379)
POTASSIUM SERPL-SCNC: 3.9 MMOL/L (ref 3.5–5.2)
RBC # BLD AUTO: 4.9 X10E6/UL (ref 3.77–5.28)
SODIUM SERPL-SCNC: 145 MMOL/L (ref 134–144)
WBC # BLD AUTO: 6.7 X10E3/UL (ref 3.4–10.8)

## 2018-08-26 NOTE — PROGRESS NOTES
[address] [place] [address] and Primary Care  [place] [address]  [address] [place] [address]  Phone:  [phone]  Fax: [phone]       Chief Complaint   Patient presents with   [facility] Wellness Visit   . SUBJECTIVE:    Mary Farmer is a 79 y.o. female comes in for return visit concerned about her throat. She has difficultly swallowing which is intermittent. This started only after she had her upper endoscopy. This revealed a gastric polyp, fairly large, which was felt by the endoscopist to be the etiology of her GI bleed. The sensation that she is getting has been quite disabling. She took iron for a short period of time for her iron-deficiency anemia. She has a past history of diabetes, primary hypertension and dyslipidemia. She remains compliant with all of her medications related to her comorbidities. Current Outpatient Prescriptions   Medication Sig Dispense Refill    aspirin delayed-release 81 mg tablet Take  by mouth daily.  cholecalciferol (VITAMIN D3) 1,000 unit cap Take 1,000 Units by mouth daily.  metFORMIN (GLUCOPHAGE) 500 mg tablet Take 1 Tab by mouth two (2) times daily (with meals). 60 Tab 11    NIFEdipine ER (ADALAT CC) 90 mg ER tablet Take 1 Tab by mouth daily. 30 Tab 11    simvastatin (ZOCOR) 80 mg tablet Take 1 Tab by mouth nightly. 30 Tab 11    losartan-hydroCHLOROthiazide (HYZAAR) 50-12.5 mg per tablet Take 1 Tab by mouth daily.  27 Tab 11     Past Medical History:   Diagnosis Date    Diabetes ([address]     [facility]ogram 2017    [facility]    Hypercholesterolemia     Hypertension      Past Surgical History:   Procedure Laterality Date    BREAST SURGERY PROCEDURE UNLISTED Right 1998    Status post breast biopsy--- benign    HX COLONOSCOPY  2015    normal--- [facility]     Allergies   Allergen Reactions    Lisinopril Swelling    Iodine Rash         REVIEW OF SYSTEMS:  General: negative for - chills or fever  ENT: negative for - headaches, nasal congestion or tinnitus  Respiratory: negative for - cough, hemoptysis, shortness of breath or wheezing  Cardiovascular : negative for - chest pain, edema, palpitations or shortness of breath  Gastrointestinal: negative for - abdominal pain, blood in stools, heartburn or nausea/vomiting  Genito-Urinary: no dysuria, trouble voiding, or hematuria  Musculoskeletal: negative for - gait disturbance, joint pain, joint stiffness or joint swelling  Neurological: no TIA or stroke symptoms  Hematologic: no bruises, no bleeding, no swollen glands  Integument: no lumps, mole changes, nail changes or rash  Endocrine: no malaise/lethargy or unexpected weight changes      Social History     Social History    Marital status: UNKNOWN     Spouse name: N/A    Number of children: N/A    Years of education: N/A     Social History Main Topics    Smoking status: Never Smoker    Smokeless tobacco: Never Used    Alcohol use Yes      Comment: socially    Drug use: No    Sexual activity: Yes     Partners: Male     Other Topics Concern    None     Social History Narrative     Family History   Problem Relation Age of Onset    Heart Disease Mother     Glaucoma Father        OBJECTIVE:    Visit Vitals    /72    Pulse (!) 102    Temp 98.5 °F (36.9 °C) (Oral)    Resp 16    Ht 5' 5\" (1.651 m)    Wt 135 lb 6.4 oz (61.4 kg)    SpO2 98%    BMI 22.53 kg/m2     CONSTITUTIONAL: well , well nourished, appears age appropriate  EYES: perrla, eom intact  ENMT:moist mucous membranes, pharynx clear  NECK: supple. Thyroid normal  RESPIRATORY: Chest: clear to ascultation and percussion   CARDIOVASCULAR: Heart: regular rate and rhythm  GASTROINTESTINAL: Abdomen: soft, bowel sounds active  HEMATOLOGIC: no pathological lymph nodes palpated  MUSCULOSKELETAL: Extremities: no edema, pulse 1+   INTEGUMENT: No unusual rashes or suspicious skin lesions noted.  Nails appear normal.  NEUROLOGIC: non-focal exam   MENTAL STATUS: alert and oriented, appropriate affect      ASSESSMENT:  1. Reflux esophagitis    2. Controlled type 2 diabetes mellitus without complication, without long-term current use of insulin ([address]    3. Iron deficiency anemia due to chronic blood loss    4. Essential hypertension    5. Dyslipidemia        PLAN:    1. The patient might well have reflux esophagitis. I will add Carafate for now but she needs to follow-up with gastroenterology. I suspect this is probably related to esophageal spasm since anatomically her esophagus was normal at the time of the endoscopy. 2. Her diabetes historically has been doing well but I will await the results of the hemoglobin A1c.    3. Her iron-deficiency anemia should be significantly better since she did take a full month of the ferrous gluconate. 4. Blood pressure is excellent, no adjustments are made. 5. She will continue statin as prescribed in view of her increased cardiovascular risk. 6. I also remind her to discontinue aspirin completely. .  Orders Placed This Encounter    CBC WITH AUTOMATED DIFF    HEMOGLOBIN A1C WITH EAG    METABOLIC PANEL, BASIC         Follow-up Disposition:  Return in about 3 months (around 11/20/2018).       Alexsandra Castro MD

## 2019-02-05 ENCOUNTER — OFFICE VISIT (OUTPATIENT)
Dept: INTERNAL MEDICINE CLINIC | Age: 69
End: 2019-02-05

## 2019-02-05 VITALS
DIASTOLIC BLOOD PRESSURE: 79 MMHG | HEART RATE: 111 BPM | TEMPERATURE: 98.6 F | OXYGEN SATURATION: 95 % | RESPIRATION RATE: 18 BRPM | BODY MASS INDEX: 23.21 KG/M2 | WEIGHT: 139.3 LBS | HEIGHT: 65 IN | SYSTOLIC BLOOD PRESSURE: 155 MMHG

## 2019-02-05 DIAGNOSIS — Z00.00 WELLNESS EXAMINATION: ICD-10-CM

## 2019-02-05 DIAGNOSIS — I10 ESSENTIAL HYPERTENSION: Primary | ICD-10-CM

## 2019-02-05 DIAGNOSIS — Z13.31 SCREENING FOR DEPRESSION: ICD-10-CM

## 2019-02-05 DIAGNOSIS — Z13.39 SCREENING FOR ALCOHOLISM: ICD-10-CM

## 2019-02-05 DIAGNOSIS — E11.9 CONTROLLED TYPE 2 DIABETES MELLITUS WITHOUT COMPLICATION, WITHOUT LONG-TERM CURRENT USE OF INSULIN (HCC): ICD-10-CM

## 2019-02-05 DIAGNOSIS — E78.5 DYSLIPIDEMIA: ICD-10-CM

## 2019-02-05 DIAGNOSIS — D64.9 ANEMIA, UNSPECIFIED TYPE: ICD-10-CM

## 2019-02-05 DIAGNOSIS — I34.1 MITRAL VALVE PROLAPSE: ICD-10-CM

## 2019-02-05 NOTE — PROGRESS NOTES
Chief Complaint Patient presents with [facility] Annual Wellness Visit 1. Have you been to the ER, urgent care clinic since your last visit? Hospitalized since your last visit? No 
 
2. Have you seen or consulted any other health care providers outside of the [address] [place] [address] since your last visit? Include any pap smears or colon screening.  No

## 2019-02-05 NOTE — PATIENT INSTRUCTIONS
Medicare Wellness Visit, Female The best way to live healthy is to have a lifestyle where you eat a well-balanced diet, exercise regularly, limit alcohol use, and quit all forms of tobacco/nicotine, if applicable. Regular preventive services are another way to keep healthy. Preventive services (vaccines, screening tests, monitoring & exams) can help personalize your care plan, which helps you manage your own care. Screening tests can find health problems at the earliest stages, when they are easiest to treat. [facility] follows the current, evidence-based guidelines published by the [facility] Clarice ([facility]) when recommending preventive services for our patients. Because we follow these guidelines, sometimes recommendations change over time as research supports it. (For example, mammograms used to be recommended annually. Even though Medicare will still pay for an annual mammogram, the newer guidelines recommend a mammogram every two years for women of average risk.) Of course, you and your doctor may decide to screen more often for some diseases, based on your risk and your health status. Preventive services for you include: - Medicare offers their members a free annual wellness visit, which is time for you and your primary care provider to discuss and plan for your preventive service needs. Take advantage of this benefit every year! 
-All adults over the age of 72 should receive the recommended pneumonia vaccines. Current USPSTF guidelines recommend a series of two vaccines for the best pneumonia protection.  
-All adults should have a flu vaccine yearly and a tetanus vaccine every 10 years. All adults age 61 and older should receive a shingles vaccine once in their lifetime.   
-A bone mass density test is recommended when a woman turns 65 to screen for osteoporosis. This test is only recommended one time, as a screening. Some providers will use this same test as a disease monitoring tool if you already have osteoporosis. -All adults age 38-68 who are overweight should have a diabetes screening test once every three years.  
-Other screening tests and preventive services for persons with diabetes include: an eye exam to screen for diabetic retinopathy, a kidney function test, a foot exam, and stricter control over your cholesterol.  
-Cardiovascular screening for adults with routine risk involves an electrocardiogram (ECG) at intervals determined by your doctor.  
-Colorectal cancer screenings should be done for adults age 54-65 with no increased risk factors for colorectal cancer. There are a number of acceptable methods of screening for this type of cancer. Each test has its own benefits and drawbacks. Discuss with your doctor what is most appropriate for you during your annual wellness visit. The different tests include: colonoscopy (considered the best screening method), a fecal occult blood test, a fecal DNA test, and sigmoidoscopy. -Breast cancer screenings are recommended every other year for women of normal risk, age 54-69. 
-Cervical cancer screenings for women over age 72 are only recommended with certain risk factors.  
-All adults born between [facility] should be screened once for Hepatitis C. Here is a list of your current Health Maintenance items (your personalized list of preventive services) with a due date: 
Health Maintenance Due Topic Date Due  
 Hepatitis C Test  1950 [facility]  10/02/1960  Stool testing for trace blood  10/02/2000  Glaucoma Screening   10/02/2015  Bone Mineral Density   10/02/2015  Pneumococcal Vaccine (1 of 2 - PCV13) 10/02/2015  Flu Vaccine  08/01/2018  Hemoglobin A1C    02/20/2019

## 2019-02-05 NOTE — PROGRESS NOTES
[address] [place] [address] and Primary Care 
[place] [address] 
[address] [address] Phone:  [phone]  Fax: [phone] Chief Complaint Patient presents with [facility] Annual Wellness Visit Marysol Waggoner SUBJECTIVE: 
  Mary Farmer is a 76 y.o. female Comes in for return visit stating that she has done well. Her GI symptoms have completely gone for all practical purposes. She remains on her PPI. She is a diet controlled diabetic and hemoglobin A1c's have consistently been less than 7. She exercises on a consistent basis. She has a past history of primary hypertension and dyslipidemia. Current Outpatient Medications Medication Sig Dispense Refill  aspirin delayed-release 81 mg tablet Take  by mouth daily.  cholecalciferol (VITAMIN D3) 1,000 unit cap Take 1,000 Units by mouth daily.  metFORMIN (GLUCOPHAGE) 500 mg tablet Take 1 Tab by mouth two (2) times daily (with meals). 60 Tab 11  
 NIFEdipine ER (ADALAT CC) 90 mg ER tablet Take 1 Tab by mouth daily. 30 Tab 11  
 simvastatin (ZOCOR) 80 mg tablet Take 1 Tab by mouth nightly. 30 Tab 11  
 losartan-hydroCHLOROthiazide (HYZAAR) 50-12.5 mg per tablet Take 1 Tab by mouth daily. 30 Tab 11 Past Medical History:  
Diagnosis Date  Diabetes ([address]  History of mammogram 2017 [facility]  Hypercholesterolemia  Hypertension Past Surgical History:  
Procedure Laterality Date [address] Right 1998 Status post breast biopsy--- benign  HX COLONOSCOPY  2015  
 normal--- [facility] Allergies Allergen Reactions  Lisinopril Swelling  Iodine Rash REVIEW OF SYSTEMS: 
General: negative for - chills or fever ENT: negative for - headaches, nasal congestion or tinnitus Respiratory: negative for - cough, hemoptysis, shortness of breath or wheezing Cardiovascular : negative for - chest pain, edema, palpitations or shortness of breath Gastrointestinal: negative for - abdominal pain, blood in stools, heartburn or nausea/vomiting Genito-Urinary: no dysuria, trouble voiding, or hematuria Musculoskeletal: negative for - gait disturbance, joint pain, joint stiffness or joint swelling Neurological: no TIA or stroke symptoms Hematologic: no bruises, no bleeding, no swollen glands Integument: no lumps, mole changes, nail changes or rash Endocrine: no malaise/lethargy or unexpected weight changes Social History Socioeconomic History  Marital status: UNKNOWN Spouse name: Not on file  Number of children: Not on file  Years of education: Not on file  Highest education level: Not on file Tobacco Use  Smoking status: Never Smoker  Smokeless tobacco: Never Used Substance and Sexual Activity  Alcohol use: Yes Comment: socially  Drug use: No  
 Sexual activity: Yes  
  Partners: Male Family History Problem Relation Age of Onset  Heart Disease Mother  Glaucoma Father OBJECTIVE: 
 
Visit Vitals /79 Pulse (!) 111 Temp 98.6 °F (37 °C) Resp 18 Ht 5' 5\" (1.651 m) Wt 139 lb 4.8 oz (63.2 kg) SpO2 95% BMI 23.18 kg/m² CONSTITUTIONAL: well , well nourished, appears age appropriate EYES: perrla, eom intact ENMT:moist mucous membranes, pharynx clear NECK: supple. Thyroid normal 
RESPIRATORY: Chest: clear to ascultation and percussion CARDIOVASCULAR: Heart: regular rate and rhythm GASTROINTESTINAL: Abdomen: soft, bowel sounds active HEMATOLOGIC: no pathological lymph nodes palpated MUSCULOSKELETAL: Extremities: no edema, pulse 1+ INTEGUMENT: No unusual rashes or suspicious skin lesions noted. Nails appear normal. 
NEUROLOGIC: non-focal exam  
MENTAL STATUS: alert and oriented, appropriate affect ASSESSMENT: 
1. Essential hypertension 2. Mitral valve prolapse 3.  Controlled type 2 diabetes mellitus without complication, without long-term current use of insulin ([address] 4. Dyslipidemia 5. Anemia, unspecified type 6. Screening for alcoholism 7. Screening for depression 8. Wellness examination PLAN: 
 
1. Her blood pressure is actually excellent today at 130/70. No adjustments are made. She generally has a sympathetic surge when she comes to the office. 2. She has a history of a mitral valve prolapse, but this is stable. 3. Her diabetes historically has been doing well, but I will await the results of her hemoglobin A1c. 
4. She will continue statin as prescribed in view of her primary cardiovascular risk. 5. She does have a history of iron deficiency anemia, which resolved with the treatment of her gastritis induced by her chronic aspirin therapy. . 
Orders Placed This Encounter  Depression Screen Annual  
 HEPATITIS C QT BY PCR WITH REFLEX GENOTYPE  
 CBC WITH AUTOMATED DIFF  
 HEMOGLOBIN A1C WITH EAG  
 APOLIPOPROTEIN B  
 METABOLIC PANEL, BASIC Follow-up Disposition: 
Return in about 6 months (around 8/5/2019). Liane Childers MD 
This is the Subsequent Medicare Annual Wellness Exam, performed 12 months or more after the Initial AWV or the last Subsequent AWV I have reviewed the patient's medical history in detail and updated the computerized patient record. History Past Medical History:  
Diagnosis Date  Diabetes [facility]  History of mammogram 2017 [facility]  Hypercholesterolemia  Hypertension Past Surgical History:  
Procedure Laterality Date [address] Right [address] Status post breast biopsy--- benign  HX COLONOSCOPY  2015  
 normal--- [facility] Current Outpatient Medications Medication Sig Dispense Refill  aspirin delayed-release 81 mg tablet Take  by mouth daily.  cholecalciferol (VITAMIN D3) 1,000 unit cap Take 1,000 Units by mouth daily.  metFORMIN (GLUCOPHAGE) 500 mg tablet Take 1 Tab by mouth two (2) times daily (with meals). 60 Tab 11  
 NIFEdipine ER (ADALAT CC) 90 mg ER tablet Take 1 Tab by mouth daily. 30 Tab 11  
 simvastatin (ZOCOR) 80 mg tablet Take 1 Tab by mouth nightly. 30 Tab 11  
 losartan-hydroCHLOROthiazide (HYZAAR) 50-12.5 mg per tablet Take 1 Tab by mouth daily. 30 Tab 11 Allergies Allergen Reactions  Lisinopril Swelling  Iodine Rash Family History Problem Relation Age of Onset  Heart Disease Mother  Glaucoma Father Social History Tobacco Use  Smoking status: Never Smoker  Smokeless tobacco: Never Used Substance Use Topics  Alcohol use: Yes Comment: socially Patient Active Problem List  
Diagnosis Code  Essential hypertension I10  
 Controlled type 2 diabetes mellitus without complication, without long-term current use of insulin ([facility]) E11.9  Dyslipidemia E78.5  Mitral valve prolapse I34.1  Iron deficiency anemia due to chronic blood loss D50.0 Depression Risk Factor Screening: PHQ over the last two weeks 2/5/2019 Little interest or pleasure in doing things Not at all Feeling down, depressed, irritable, or hopeless Not at all Total Score PHQ 2 0 Alcohol Risk Factor Screening: You do not drink alcohol or very rarely. Functional Ability and Level of Safety:  
Hearing Loss Hearing is good. Activities of Daily Living The home contains: no safety equipment. Patient does total self care Fall Risk Fall Risk Assessment, last 12 mths 2/5/2019 Able to walk? Yes Fall in past 12 months? No  
 
 
Abuse Screen Patient is not abused Cognitive Screening Evaluation of Cognitive Function: 
Has your family/caregiver stated any concerns about your memory: no 
Normal 
 
Patient Care Team  
Patient Care Team: 
Torey Mendosa MD as PCP - General (Internal Medicine) Assessment/Plan Education and counseling provided: Are appropriate based on today's review and evaluation Diagnoses and all orders for this visit: 1. Essential hypertension -     METABOLIC PANEL, BASIC 2. Mitral valve prolapse 3. Controlled type 2 diabetes mellitus without complication, without long-term current use of insulin (HCC) 
-     HEPATITIS C QT BY PCR WITH REFLEX GENOTYPE 
-     HEMOGLOBIN A1C WITH EAG 4. Dyslipidemia -     APOLIPOPROTEIN B 
 
5. Anemia, unspecified type 
-     CBC WITH AUTOMATED DIFF 6. Screening for alcoholism -     DC ANNUAL ALCOHOL SCREEN 15 MIN 
 
7. Screening for depression 
-     [place] [address] 8. Wellness examination Health Maintenance Due Topic Date Due  
 Hepatitis C Screening  1950  
 FOOT EXAM Q1  10/02/1960  
 FOBT Q 1 YEAR AGE 50-75  10/02/2000  GLAUCOMA SCREENING Q2Y  10/02/2015  Bone Densitometry (Dexa) Screening  10/02/2015  Pneumococcal 65+ Low/Medium Risk (1 of 2 - PCV13) 10/02/2015  Influenza Age 5 to Adult  08/01/2018  HEMOGLOBIN A1C Q6M  02/20/2019

## 2019-02-12 LAB
APO B SERPL-MCNC: 116 MG/DL
BASOPHILS # BLD AUTO: 0 X10E3/UL (ref 0–0.2)
BASOPHILS NFR BLD AUTO: 0 %
BUN SERPL-MCNC: 11 MG/DL (ref 8–27)
BUN/CREAT SERPL: 14 (ref 12–28)
CALCIUM SERPL-MCNC: 10.8 MG/DL (ref 8.7–10.3)
CHLORIDE SERPL-SCNC: 99 MMOL/L (ref 96–106)
CO2 SERPL-SCNC: 23 MMOL/L (ref 20–29)
CREAT SERPL-MCNC: 0.76 MG/DL (ref 0.57–1)
EOSINOPHIL # BLD AUTO: 0.1 X10E3/UL (ref 0–0.4)
EOSINOPHIL NFR BLD AUTO: 1 %
ERYTHROCYTE [DISTWIDTH] IN BLOOD BY AUTOMATED COUNT: 14.1 % (ref 12.3–15.4)
EST. AVERAGE GLUCOSE BLD GHB EST-MCNC: 148 MG/DL
GLUCOSE SERPL-MCNC: 127 MG/DL (ref 65–99)
HBA1C MFR BLD: 6.8 % (ref 4.8–5.6)
HCT VFR BLD AUTO: 42.9 % (ref 34–46.6)
HCV GENOTYPE: NORMAL
HCV RNA SERPL NAA+PROBE-ACNC: NORMAL IU/ML
HCV RNA SERPL NAA+PROBE-LOG IU: NORMAL LOG10 IU/ML
HGB BLD-MCNC: 14.1 G/DL (ref 11.1–15.9)
IMM GRANULOCYTES # BLD AUTO: 0 X10E3/UL (ref 0–0.1)
IMM GRANULOCYTES NFR BLD AUTO: 0 %
LYMPHOCYTES # BLD AUTO: 2.1 X10E3/UL (ref 0.7–3.1)
LYMPHOCYTES NFR BLD AUTO: 31 %
MCH RBC QN AUTO: 28.9 PG (ref 26.6–33)
MCHC RBC AUTO-ENTMCNC: 32.9 G/DL (ref 31.5–35.7)
MCV RBC AUTO: 88 FL (ref 79–97)
MONOCYTES # BLD AUTO: 0.5 X10E3/UL (ref 0.1–0.9)
MONOCYTES NFR BLD AUTO: 7 %
NEUTROPHILS # BLD AUTO: 4.1 X10E3/UL (ref 1.4–7)
NEUTROPHILS NFR BLD AUTO: 61 %
PLATELET # BLD AUTO: 368 X10E3/UL (ref 150–379)
POTASSIUM SERPL-SCNC: 4.7 MMOL/L (ref 3.5–5.2)
RBC # BLD AUTO: 4.88 X10E6/UL (ref 3.77–5.28)
SODIUM SERPL-SCNC: 143 MMOL/L (ref 134–144)
TEST INFORMATION: NORMAL
WBC # BLD AUTO: 6.8 X10E3/UL (ref 3.4–10.8)

## 2019-04-10 DIAGNOSIS — I10 ESSENTIAL HYPERTENSION: ICD-10-CM

## 2019-04-10 RX ORDER — NIFEDIPINE 90 MG/1
90 TABLET, FILM COATED, EXTENDED RELEASE ORAL DAILY
Qty: 30 TAB | Refills: 11 | Status: SHIPPED | OUTPATIENT
Start: 2019-04-10 | End: 2019-11-24 | Stop reason: CLARIF

## 2019-04-25 RX ORDER — TELMISARTAN AND HYDROCHLORTHIAZIDE 80; 12.5 MG/1; MG/1
1 TABLET ORAL DAILY
Qty: 30 TAB | Refills: 11 | Status: SHIPPED | OUTPATIENT
Start: 2019-04-25 | End: 2019-08-11 | Stop reason: CLARIF

## 2019-05-16 DIAGNOSIS — E78.5 DYSLIPIDEMIA: ICD-10-CM

## 2019-05-16 NOTE — TELEPHONE ENCOUNTER
Received call from patient c/o trouble swallowing medication simvastatin 80 mg. Medication changed 40 mg 2 tabs q day per Dr. Petra Cordova. Pt contacted and made aware.

## 2019-05-17 RX ORDER — SIMVASTATIN 40 MG/1
TABLET, FILM COATED ORAL
Qty: 80 TAB | Refills: 11 | Status: SHIPPED | OUTPATIENT
Start: 2019-05-17 | End: 2019-05-29 | Stop reason: SDUPTHER

## 2019-05-29 DIAGNOSIS — E78.5 DYSLIPIDEMIA: ICD-10-CM

## 2019-05-29 RX ORDER — SIMVASTATIN 40 MG/1
TABLET, FILM COATED ORAL
Qty: 60 TAB | Refills: 11 | Status: SHIPPED | OUTPATIENT
Start: 2019-05-29 | End: 2019-11-24 | Stop reason: CLARIF

## 2019-06-28 DIAGNOSIS — E11.9 CONTROLLED TYPE 2 DIABETES MELLITUS WITHOUT COMPLICATION, WITHOUT LONG-TERM CURRENT USE OF INSULIN (HCC): Primary | ICD-10-CM

## 2019-06-28 DIAGNOSIS — E11.9 CONTROLLED TYPE 2 DIABETES MELLITUS WITHOUT COMPLICATION, WITHOUT LONG-TERM CURRENT USE OF INSULIN (HCC): ICD-10-CM

## 2019-06-28 RX ORDER — METFORMIN HYDROCHLORIDE 500 MG/1
500 TABLET, EXTENDED RELEASE ORAL 2 TIMES DAILY WITH MEALS
Qty: 180 TAB | Refills: 3 | Status: SHIPPED | OUTPATIENT
Start: 2019-06-28

## 2019-06-28 RX ORDER — METFORMIN HYDROCHLORIDE 500 MG/1
500 TABLET ORAL 2 TIMES DAILY WITH MEALS
Qty: 60 TAB | Refills: 11 | OUTPATIENT
Start: 2019-06-28

## 2019-07-11 ENCOUNTER — TELEPHONE (OUTPATIENT)
Dept: INTERNAL MEDICINE CLINIC | Age: 69
End: 2019-07-11

## 2019-08-05 ENCOUNTER — OFFICE VISIT (OUTPATIENT)
Dept: INTERNAL MEDICINE CLINIC | Age: 69
End: 2019-08-05

## 2019-08-05 VITALS
HEIGHT: 65 IN | HEART RATE: 91 BPM | TEMPERATURE: 98.2 F | BODY MASS INDEX: 21.84 KG/M2 | WEIGHT: 131.1 LBS | RESPIRATION RATE: 16 BRPM | DIASTOLIC BLOOD PRESSURE: 75 MMHG | OXYGEN SATURATION: 95 % | SYSTOLIC BLOOD PRESSURE: 171 MMHG

## 2019-08-05 DIAGNOSIS — I10 ESSENTIAL HYPERTENSION: Primary | ICD-10-CM

## 2019-08-05 DIAGNOSIS — K21.00 ESOPHAGITIS, REFLUX: ICD-10-CM

## 2019-08-05 DIAGNOSIS — D50.0 IRON DEFICIENCY ANEMIA DUE TO CHRONIC BLOOD LOSS: ICD-10-CM

## 2019-08-05 DIAGNOSIS — E11.9 CONTROLLED TYPE 2 DIABETES MELLITUS WITHOUT COMPLICATION, WITHOUT LONG-TERM CURRENT USE OF INSULIN (HCC): ICD-10-CM

## 2019-08-05 DIAGNOSIS — E78.5 DYSLIPIDEMIA: ICD-10-CM

## 2019-08-05 DIAGNOSIS — R63.4 WEIGHT LOSS: ICD-10-CM

## 2019-08-05 NOTE — PROGRESS NOTES
Chief Complaint   Patient presents with    Diabetes     6 month follow up      1. Have you been to the ER, urgent care clinic since your last visit? Hospitalized since your last visit? No    2. Have you seen or consulted any other health care providers outside of the [address] [place] [address] since your last visit? Include any pap smears or colon screening.  No

## 2019-08-06 LAB
ALBUMIN SERPL-MCNC: 5.3 G/DL (ref 3.6–4.8)
ALBUMIN/GLOB SERPL: 1.7 {RATIO} (ref 1.2–2.2)
ALP SERPL-CCNC: 54 IU/L (ref 39–117)
ALT SERPL-CCNC: 14 IU/L (ref 0–32)
APO B SERPL-MCNC: 126 MG/DL
APPEARANCE UR: CLEAR
AST SERPL-CCNC: 22 IU/L (ref 0–40)
BASOPHILS # BLD AUTO: 0 X10E3/UL (ref 0–0.2)
BASOPHILS NFR BLD AUTO: 1 %
BILIRUB SERPL-MCNC: 0.9 MG/DL (ref 0–1.2)
BILIRUB UR QL STRIP: NEGATIVE
BUN SERPL-MCNC: 9 MG/DL (ref 8–27)
BUN/CREAT SERPL: 13 (ref 12–28)
CALCIUM SERPL-MCNC: 10.5 MG/DL (ref 8.7–10.3)
CHLORIDE SERPL-SCNC: 102 MMOL/L (ref 96–106)
CHOLEST SERPL-MCNC: 246 MG/DL (ref 100–199)
CO2 SERPL-SCNC: 24 MMOL/L (ref 20–29)
COLOR UR: YELLOW
CREAT SERPL-MCNC: 0.72 MG/DL (ref 0.57–1)
EOSINOPHIL # BLD AUTO: 0.1 X10E3/UL (ref 0–0.4)
EOSINOPHIL NFR BLD AUTO: 1 %
ERYTHROCYTE [DISTWIDTH] IN BLOOD BY AUTOMATED COUNT: 14.6 % (ref 12.3–15.4)
EST. AVERAGE GLUCOSE BLD GHB EST-MCNC: 134 MG/DL
GLOBULIN SER CALC-MCNC: 3.2 G/DL (ref 1.5–4.5)
GLUCOSE SERPL-MCNC: 89 MG/DL (ref 65–99)
GLUCOSE UR QL: NEGATIVE
HBA1C MFR BLD: 6.3 % (ref 4.8–5.6)
HCT VFR BLD AUTO: 45 % (ref 34–46.6)
HDLC SERPL-MCNC: 76 MG/DL
HGB BLD-MCNC: 15.7 G/DL (ref 11.1–15.9)
HGB UR QL STRIP: NEGATIVE
IMM GRANULOCYTES # BLD AUTO: 0 X10E3/UL (ref 0–0.1)
IMM GRANULOCYTES NFR BLD AUTO: 0 %
KETONES UR QL STRIP: NEGATIVE
LDLC SERPL CALC-MCNC: 156 MG/DL (ref 0–99)
LEUKOCYTE ESTERASE UR QL STRIP: NEGATIVE
LYMPHOCYTES # BLD AUTO: 2.3 X10E3/UL (ref 0.7–3.1)
LYMPHOCYTES NFR BLD AUTO: 34 %
MCH RBC QN AUTO: 30.9 PG (ref 26.6–33)
MCHC RBC AUTO-ENTMCNC: 34.9 G/DL (ref 31.5–35.7)
MCV RBC AUTO: 89 FL (ref 79–97)
MICRO URNS: ABNORMAL
MONOCYTES # BLD AUTO: 0.4 X10E3/UL (ref 0.1–0.9)
MONOCYTES NFR BLD AUTO: 6 %
NEUTROPHILS # BLD AUTO: 4 X10E3/UL (ref 1.4–7)
NEUTROPHILS NFR BLD AUTO: 58 %
NITRITE UR QL STRIP: NEGATIVE
PH UR STRIP: 8 [PH] (ref 5–7.5)
PLATELET # BLD AUTO: 280 X10E3/UL (ref 150–450)
POTASSIUM SERPL-SCNC: 3.5 MMOL/L (ref 3.5–5.2)
PROT SERPL-MCNC: 8.5 G/DL (ref 6–8.5)
PROT UR QL STRIP: NEGATIVE
RBC # BLD AUTO: 5.08 X10E6/UL (ref 3.77–5.28)
SODIUM SERPL-SCNC: 147 MMOL/L (ref 134–144)
SP GR UR: 1.01 (ref 1–1.03)
TRIGL SERPL-MCNC: 72 MG/DL (ref 0–149)
TSH SERPL DL<=0.005 MIU/L-ACNC: 1.17 UIU/ML (ref 0.45–4.5)
UROBILINOGEN UR STRIP-MCNC: 0.2 MG/DL (ref 0.2–1)
VLDLC SERPL CALC-MCNC: 14 MG/DL (ref 5–40)
WBC # BLD AUTO: 6.8 X10E3/UL (ref 3.4–10.8)

## 2019-08-11 PROBLEM — R63.4 WEIGHT LOSS: Status: ACTIVE | Noted: 2019-08-11

## 2019-08-11 PROBLEM — K21.00 ESOPHAGITIS, REFLUX: Status: ACTIVE | Noted: 2019-08-11

## 2019-08-11 RX ORDER — HYDROCHLOROTHIAZIDE 12.5 MG/1
12.5 TABLET ORAL DAILY
Qty: 90 TAB | Refills: 3 | Status: SHIPPED | OUTPATIENT
Start: 2019-08-11

## 2019-08-11 RX ORDER — TELMISARTAN 80 MG/1
80 TABLET ORAL DAILY
Qty: 80 TAB | Refills: 11 | Status: SHIPPED | OUTPATIENT
Start: 2019-08-11

## 2019-08-11 NOTE — PROGRESS NOTES
[address] [facility] and Primary Care  [place] [address]  [address] [place] [address]  Phone:  [phone]  Fax: [phone]       Chief Complaint   Patient presents with    Diabetes     6 month follow up    . SUBJECTIVE:    Mary Farmer is a 76 y.o. female Comes in for return visit, continuing to complain of difficulty swallowing since she had her upper endoscopy done. Follow up with gastroenterology did not reveal any obvious abnormalities of her posterior pharynx or esophagus. As a result, she is having difficulty swallowing medications she could formerly swallow previously, specifically the Metformin. As a result, she is not really taking this on a consistent basis. The Telmisartan/HCTZ is too large for her also. She has vague right lower quadrant pain aggravated with eating. She also complains of reflux type symptoms. Above and beyond her diabetes, she has a history of hypertension, as I commented earlier, and dyslipidemia. The other major finding is the fact that she has lost weight. I suspect this is related to change in her eating habits that has occurred as a direct result of the difficulties she feels swallowing. Current Outpatient Medications   Medication Sig Dispense Refill    telmisartan (MICARDIS) 80 mg tablet Take 1 Tab by mouth daily. 80 Tab 11    hydroCHLOROthiazide (HYDRODIURIL) 12.5 mg tablet Take 1 Tab by mouth daily. 90 Tab 3    metFORMIN ER (GLUCOPHAGE XR) 500 mg tablet Take 1 Tab by mouth two (2) times daily (with meals). 180 Tab 3    simvastatin (ZOCOR) 40 mg tablet TAKE 2 TABS AT BEDTIME 60 Tab 11    NIFEdipine ER (ADALAT CC) 90 mg ER tablet Take 1 Tab by mouth daily. 30 Tab 11    cholecalciferol (VITAMIN D3) 1,000 unit cap Take 1,000 Units by mouth daily.  aspirin delayed-release 81 mg tablet Take  by mouth daily.        Past Medical History:   Diagnosis Date    Diabetes [facility])     [facility] of [facility] 2017    [facility]    Hypercholesterolemia     Hypertension      Past Surgical History:   Procedure Laterality Date    BREAST SURGERY PROCEDURE UNLISTED Right 1998    Status post breast biopsy--- benign    HX COLONOSCOPY  2015    normal--- [facility]     Allergies   Allergen Reactions    Lisinopril Swelling    Iodine Rash         REVIEW OF SYSTEMS:  General: negative for - chills or fever  ENT: negative for - headaches, nasal congestion or tinnitus  Respiratory: negative for - cough, hemoptysis, shortness of breath or wheezing  Cardiovascular : negative for - chest pain, edema, palpitations or shortness of breath  Gastrointestinal: negative for - abdominal pain, blood in stools, heartburn or nausea/vomiting  Genito-Urinary: no dysuria, trouble voiding, or hematuria  Musculoskeletal: negative for - gait disturbance, joint pain, joint stiffness or joint swelling  Neurological: no TIA or stroke symptoms  Hematologic: no bruises, no bleeding, no swollen glands  Integument: no lumps, mole changes, nail changes or rash  Endocrine: no malaise/lethargy or unexpected weight changes      Social History     Socioeconomic History    Marital status: UNKNOWN     Spouse name: Not on file    Number of children: Not on file    Years of education: Not on file    Highest education level: Not on file   Tobacco Use    Smoking status: Never Smoker    Smokeless tobacco: Never Used   Substance and Sexual Activity    Alcohol use: Yes     Comment: socially    Drug use: No    Sexual activity: Yes     Partners: Male     Family History   Problem Relation Age of Onset    Heart Disease Mother     Glaucoma Father        OBJECTIVE:    Visit Vitals  /75   Pulse 91   Temp 98.2 °F (36.8 °C) (Oral)   Resp 16   Ht 5' 5\" (1.651 m)   Wt 131 lb 1.6 oz (59.5 kg)   SpO2 95%   BMI 21.82 kg/m²     CONSTITUTIONAL: well , well nourished, appears age appropriate  EYES: perrla, eom intact  ENMT:moist mucous membranes, pharynx clear  NECK: supple. Thyroid normal  RESPIRATORY: Chest: clear to ascultation and percussion   CARDIOVASCULAR: Heart: regular rate and rhythm  GASTROINTESTINAL: Abdomen: soft, bowel sounds active  HEMATOLOGIC: no pathological lymph nodes palpated  MUSCULOSKELETAL: Extremities: no edema, pulse 1+   INTEGUMENT: No unusual rashes or suspicious skin lesions noted. Nails appear normal.  NEUROLOGIC: non-focal exam   MENTAL STATUS: alert and oriented, appropriate affect      ASSESSMENT:  1. Essential hypertension    2. Controlled type 2 diabetes mellitus without complication, without long-term current use of insulin ([facility]    3. Dyslipidemia    4. Iron deficiency anemia due to chronic blood loss    5. Esophagitis, reflux    6. Weight loss        PLAN:    1. Her BP is elevated, but that is because she is not consistently compliant with her medication. I will change Telmisartan/HCTZ to individual pills. She feels that she can swallow this on a consistent basis. 2. From a diabetic perspective, I will await the results of her hemoglobin A1c, but I suspect she is probably euglycemic, particularly in view of the weight loss. 3. She will continue statin as prescribed. 4. I had her perform the upper and lower endoscopy because if iron deficiency anemia. The workup was essentially negative at that time, therefore she is relegated to iron supplements for now. If this persists, she will have to see a hematologist.  5. She appears to have reflux esophagitis contributing to her swallowing difficulties. I suggest using Pepcid until she returns in three months. She should take 40 mg daily. If no improvement, then she will have to see an ENT physician, as well as another gastroenterologist.  6. Her weight loss is bothersome, but can be explained due to the fact that she has decreased her caloric intake as a direct result of the swallowing difficulties.   I have no suspicion that this is related to cancer because she had an upper endoscopy done. .  Orders Placed This Encounter    HEMOGLOBIN A1C WITH EAG    APOLIPOPROTEIN B    CBC WITH AUTOMATED DIFF    LIPID PANEL    METABOLIC PANEL, COMPREHENSIVE    TSH 3RD GENERATION    URINALYSIS W/ RFLX MICROSCOPIC    telmisartan (MICARDIS) 80 mg tablet    hydroCHLOROthiazide (HYDRODIURIL) 12.5 mg tablet         Follow-up and Dispositions    · Return in about 3 months (around 11/5/2019).            Ting Vo MD

## 2019-11-19 ENCOUNTER — OFFICE VISIT (OUTPATIENT)
Dept: INTERNAL MEDICINE CLINIC | Age: 69
End: 2019-11-19

## 2019-11-19 VITALS
TEMPERATURE: 99 F | HEART RATE: 85 BPM | BODY MASS INDEX: 21.54 KG/M2 | RESPIRATION RATE: 16 BRPM | SYSTOLIC BLOOD PRESSURE: 203 MMHG | DIASTOLIC BLOOD PRESSURE: 89 MMHG | WEIGHT: 129.3 LBS | HEIGHT: 65 IN | OXYGEN SATURATION: 96 %

## 2019-11-19 DIAGNOSIS — K21.00 ESOPHAGITIS, REFLUX: ICD-10-CM

## 2019-11-19 DIAGNOSIS — I10 ESSENTIAL HYPERTENSION: Primary | ICD-10-CM

## 2019-11-19 DIAGNOSIS — R63.4 WEIGHT LOSS: ICD-10-CM

## 2019-11-19 DIAGNOSIS — E78.5 DYSLIPIDEMIA: ICD-10-CM

## 2019-11-19 DIAGNOSIS — E11.9 CONTROLLED TYPE 2 DIABETES MELLITUS WITHOUT COMPLICATION, WITHOUT LONG-TERM CURRENT USE OF INSULIN (HCC): ICD-10-CM

## 2019-11-19 DIAGNOSIS — R13.11 ORAL PHASE DYSPHAGIA: ICD-10-CM

## 2019-11-19 NOTE — PROGRESS NOTES
[address] [facility] and Primary Care  [place] [address]  [address] [facility] [address]  Phone:  [phone]  Fax: [phone]       Chief Complaint   Patient presents with    Hypertension     3 month follow up    . SUBJECTIVE:    Mary Farmer is a 71 y.o. female Comes in for return visit continuing to complain of difficulty swallowing. She has seen an ENT doctor on multiple occasions and nothing was found, other than some apparent vocal cord pathology, which was not deemed significant. There is a sensation in her throat. She also has reflux symptoms, rather impressive. She has noted slight increased urinary frequency. She has had intermittent low back pain, oftentimes just to the right side, aggravated with lying on that side. She has lost weight, but it has only been 3 pounds since her last visit. She has occasional palpitations. She thinks her medication might be a contributing factor to her current symptoms. Current Outpatient Medications   Medication Sig Dispense Refill    amLODIPine (NORVASC) 10 mg tablet Take 1 Tab by mouth daily. 30 Tab 11    metFORMIN 500 mg/5 mL soln Take 5 mL by mouth two (2) times daily (with meals). 300 mL 11    telmisartan (MICARDIS) 80 mg tablet Take 1 Tab by mouth daily. 80 Tab 11    hydroCHLOROthiazide (HYDRODIURIL) 12.5 mg tablet Take 1 Tab by mouth daily. 90 Tab 3    metFORMIN ER (GLUCOPHAGE XR) 500 mg tablet Take 1 Tab by mouth two (2) times daily (with meals). 180 Tab 3    cholecalciferol (VITAMIN D3) 1,000 unit cap Take 1,000 Units by mouth daily.  pantoprazole (PROTONIX) 40 mg tablet Take 1 Tab by mouth daily. Indications: gastroesophageal reflux disease 30 Tab 11    rosuvastatin (CRESTOR) 5 mg tablet Take 1 Tab by mouth nightly.  Indications: high cholesterol 30 Tab 11     Past Medical History:   Diagnosis Date    Diabetes ([address]     [facility]    Hypercholesterolemia  Hypertension      Past Surgical History:   Procedure Laterality Date    BREAST SURGERY PROCEDURE UNLISTED Right 1998    Status post breast biopsy--- benign    HX COLONOSCOPY  2015    normal--- [facility]     Allergies   Allergen Reactions    Lisinopril Swelling    Iodine Rash         REVIEW OF SYSTEMS:  General: negative for - chills or fever  ENT: negative for - headaches, nasal congestion or tinnitus  Respiratory: negative for - cough, hemoptysis, shortness of breath or wheezing  Cardiovascular : negative for - chest pain, edema, palpitations or shortness of breath  Gastrointestinal: negative for - abdominal pain, blood in stools, heartburn or nausea/vomiting  Genito-Urinary: no dysuria, trouble voiding, or hematuria  Musculoskeletal: negative for - gait disturbance, joint pain, joint stiffness or joint swelling  Neurological: no TIA or stroke symptoms  Hematologic: no bruises, no bleeding, no swollen glands  Integument: no lumps, mole changes, nail changes or rash  Endocrine: no malaise/lethargy or unexpected weight changes      Social History     Socioeconomic History    Marital status: UNKNOWN     Spouse name: Not on file    Number of children: Not on file    Years of education: Not on file    Highest education level: Not on file   Tobacco Use    Smoking status: Never Smoker    Smokeless tobacco: Never Used   Substance and Sexual Activity    Alcohol use: Yes     Comment: socially    Drug use: No    Sexual activity: Yes     Partners: Male     Family History   Problem Relation Age of Onset    Heart Disease Mother     Glaucoma Father        OBJECTIVE:    Visit Vitals  BP (!) 203/89   Pulse 85   Temp 99 °F (37.2 °C) (Oral)   Resp 16   Ht 5' 5\" (1.651 m)   Wt 129 lb 4.8 oz (58.7 kg)   SpO2 96%   BMI 21.52 kg/m²     CONSTITUTIONAL: well , well nourished, appears age appropriate  EYES: perrla, eom intact  ENMT:moist mucous membranes, pharynx clear  NECK: supple.  Thyroid normal  RESPIRATORY: Chest: clear to ascultation and percussion   CARDIOVASCULAR: Heart: regular rate and rhythm  GASTROINTESTINAL: Abdomen: soft, bowel sounds active  HEMATOLOGIC: no pathological lymph nodes palpated  MUSCULOSKELETAL: Extremities: no edema, pulse 1+   INTEGUMENT: No unusual rashes or suspicious skin lesions noted. Nails appear normal.  NEUROLOGIC: non-focal exam   MENTAL STATUS: alert and oriented, appropriate affect      ASSESSMENT:  1. Essential hypertension    2. Controlled type 2 diabetes mellitus without complication, without long-term current use of insulin ([address]    3. Dyslipidemia    4. Weight loss    5. Esophagitis, reflux    6. Oral phase dysphagia        PLAN:    1. Her BP is elevated because she has not been taking her antihypertensive medication on a consistent basis. It is difficult for her to swallow the large pill and would like something smaller. 2. She continues to have difficulty swallowing, which started after she had upper endoscopy. She has seen an ENT physician and gastroenterologist and nothing has been found. Her caloric intake has decreased and she is able to eat only soft type foods. 3. She has intermittent low back pain radiating to the right. 4. She would like to change her medications to ones that are much smaller to improve her compliance. This is important, particularly as it relates to her statin. 5. Her weight loss is not pathological at this point, she has only lost 2-3 pounds since her last visit. 6. There is an element of possibly esophagitis based on her description, but again, this was addressed by the gastroenterologist.  7. As far as her diabetes is concerned, she has lost weight, so she may not even need Metformin. I do not think there is an elixir for now, therefore she will hold Metformin and I will see how her sugars go. 8. She will discontinue all aspirin products. She will be placed on a PPI.           Follow-up and Dispositions    · Return in about 3 months (around 2/19/2020).            Mirela Nguyễn MD

## 2019-11-19 NOTE — PROGRESS NOTES
Chief Complaint   Patient presents with    Hypertension     3 month follow up      1. Have you been to the ER, urgent care clinic since your last visit? Hospitalized since your last visit? No    2. Have you seen or consulted any other health care providers outside of the [address] [place] [address] since your last visit? Include any pap smears or colon screening.  No

## 2019-11-20 LAB
ALBUMIN/CREAT UR: 174 MG/G CREAT (ref 0–30)
CREAT UR-MCNC: 38.9 MG/DL
EST. AVERAGE GLUCOSE BLD GHB EST-MCNC: NORMAL MG/DL
HBA1C MFR BLD: NORMAL %
MICROALBUMIN UR-MCNC: 67.7 UG/ML
PREALB SERPL-MCNC: 26 MG/DL (ref 10–36)
TSH SERPL DL<=0.005 MIU/L-ACNC: 1.21 UIU/ML (ref 0.45–4.5)

## 2019-11-20 RX ORDER — ROSUVASTATIN CALCIUM 5 MG/1
5 TABLET, COATED ORAL
Qty: 30 TAB | Refills: 11 | Status: SHIPPED | OUTPATIENT
Start: 2019-11-20

## 2019-11-20 NOTE — TELEPHONE ENCOUNTER
New order per Dr. Panda Hallmark Rosuvastatin (Crestor) 5 mg tablet. Take 1 tablet by mouth nightly. #30 -11 refills.

## 2019-11-21 NOTE — TELEPHONE ENCOUNTER
New rx called in for Norvasc 5 mg daily per Dr. Refugio Bear, Pt advised to stop NIFEdipine 90 mg. Pt verbalized understanding.

## 2019-11-22 RX ORDER — PANTOPRAZOLE SODIUM 40 MG/1
40 TABLET, DELAYED RELEASE ORAL DAILY
Qty: 30 TAB | Refills: 11 | Status: SHIPPED | OUTPATIENT
Start: 2019-11-22

## 2019-11-24 RX ORDER — METFORMIN HYDROCHLORIDE 500 MG/5ML
5 SOLUTION ORAL 2 TIMES DAILY WITH MEALS
Qty: 300 ML | Refills: 11 | Status: SHIPPED | OUTPATIENT
Start: 2019-11-24 | End: 2022-03-06

## 2019-11-24 RX ORDER — AMLODIPINE BESYLATE 10 MG/1
10 TABLET ORAL DAILY
Qty: 30 TAB | Refills: 11
Start: 2019-11-24 | End: 2020-12-03 | Stop reason: SDUPTHER

## 2020-01-16 ENCOUNTER — OFFICE VISIT (OUTPATIENT)
Dept: INTERNAL MEDICINE CLINIC | Age: 70
End: 2020-01-16

## 2020-01-16 VITALS
HEIGHT: 65 IN | RESPIRATION RATE: 16 BRPM | WEIGHT: 128.2 LBS | HEART RATE: 100 BPM | TEMPERATURE: 98.6 F | DIASTOLIC BLOOD PRESSURE: 70 MMHG | OXYGEN SATURATION: 98 % | SYSTOLIC BLOOD PRESSURE: 160 MMHG | BODY MASS INDEX: 21.36 KG/M2

## 2020-01-16 DIAGNOSIS — D50.0 IRON DEFICIENCY ANEMIA DUE TO CHRONIC BLOOD LOSS: ICD-10-CM

## 2020-01-16 DIAGNOSIS — R01.1 FLOW MURMUR: ICD-10-CM

## 2020-01-16 DIAGNOSIS — E78.5 DYSLIPIDEMIA: ICD-10-CM

## 2020-01-16 DIAGNOSIS — E11.9 CONTROLLED TYPE 2 DIABETES MELLITUS WITHOUT COMPLICATION, WITHOUT LONG-TERM CURRENT USE OF INSULIN (HCC): ICD-10-CM

## 2020-01-16 DIAGNOSIS — I10 ESSENTIAL HYPERTENSION: Primary | ICD-10-CM

## 2020-01-16 NOTE — PROGRESS NOTES
Chief Complaint   Patient presents with    Hypertension    Diabetes     1. Have you been to the ER, urgent care clinic since your last visit? Hospitalized since your last visit? No    2. Have you seen or consulted any other health care providers outside of the [address] [place] [address] since your last visit? Include any pap smears or colon screening.  No

## 2020-01-17 LAB
ALBUMIN SERPL-MCNC: NORMAL G/DL
ALP SERPL-CCNC: NORMAL U/L
ALT SERPL-CCNC: NORMAL U/L
APO B SERPL-MCNC: NORMAL MG/DL
AST SERPL-CCNC: NORMAL U/L
BASOPHILS # BLD AUTO: 0.1 X10E3/UL (ref 0–0.2)
BASOPHILS NFR BLD AUTO: 1 %
BILIRUB SERPL-MCNC: NORMAL MG/DL
BUN SERPL-MCNC: NORMAL MG/DL
CALCIUM SERPL-MCNC: NORMAL MG/DL
CHLORIDE SERPL-SCNC: NORMAL MMOL/L
CO2 SERPL-SCNC: NORMAL MMOL/L
CREAT SERPL-MCNC: NORMAL MG/DL
EOSINOPHIL # BLD AUTO: 0.1 X10E3/UL (ref 0–0.4)
EOSINOPHIL NFR BLD AUTO: 1 %
ERYTHROCYTE [DISTWIDTH] IN BLOOD BY AUTOMATED COUNT: 12.9 % (ref 11.7–15.4)
EST. AVERAGE GLUCOSE BLD GHB EST-MCNC: 128 MG/DL
GLUCOSE SERPL-MCNC: NORMAL MG/DL
HBA1C MFR BLD: 6.1 % (ref 4.8–5.6)
HCT VFR BLD AUTO: 40.9 % (ref 34–46.6)
HGB BLD-MCNC: 14 G/DL (ref 11.1–15.9)
IMM GRANULOCYTES # BLD AUTO: 0 X10E3/UL (ref 0–0.1)
IMM GRANULOCYTES NFR BLD AUTO: 0 %
LYMPHOCYTES # BLD AUTO: 2.2 X10E3/UL (ref 0.7–3.1)
LYMPHOCYTES NFR BLD AUTO: 28 %
MCH RBC QN AUTO: 29.9 PG (ref 26.6–33)
MCHC RBC AUTO-ENTMCNC: 34.2 G/DL (ref 31.5–35.7)
MCV RBC AUTO: 87 FL (ref 79–97)
MONOCYTES # BLD AUTO: 0.6 X10E3/UL (ref 0.1–0.9)
MONOCYTES NFR BLD AUTO: 7 %
NEUTROPHILS # BLD AUTO: 4.9 X10E3/UL (ref 1.4–7)
NEUTROPHILS NFR BLD AUTO: 63 %
PLATELET # BLD AUTO: 263 X10E3/UL (ref 150–450)
POTASSIUM SERPL-SCNC: NORMAL MMOL/L
PROT SERPL-MCNC: NORMAL G/DL
RBC # BLD AUTO: 4.68 X10E6/UL (ref 3.77–5.28)
SODIUM SERPL-SCNC: NORMAL MMOL/L
WBC # BLD AUTO: 7.8 X10E3/UL (ref 3.4–10.8)

## 2020-01-17 NOTE — PROGRESS NOTES
[facility]  [place] [address]  [address] [place] [address]  Phone:  [phone]  Fax: [phone]       Chief Complaint   Patient presents with    Hypertension    Diabetes   . SUBJECTIVE:    Mary Farmer is a 71 y.o. female Comes in for return visit stating that she is feeling somewhat better. Her appetite has improved to the point where she has gained several pounds. She continues to have periodic episodes of difficulty swallowing, oftentimes occurring in the early morning. It is not as bad as it previously was and she has not vomited or regurgitated since I last saw her. I made some changes in her medication and it has helped to a degree. She has somewhat of a phobia for pills generally. She has rather severe hypertension. She is on three different antihypertensive medications currently. She also has a past history of dyslipidemia. Current Outpatient Medications   Medication Sig Dispense Refill    amLODIPine (NORVASC) 10 mg tablet Take 1 Tab by mouth daily. 30 Tab 11    metFORMIN 500 mg/5 mL soln Take 5 mL by mouth two (2) times daily (with meals). 300 mL 11    pantoprazole (PROTONIX) 40 mg tablet Take 1 Tab by mouth daily. Indications: gastroesophageal reflux disease 30 Tab 11    rosuvastatin (CRESTOR) 5 mg tablet Take 1 Tab by mouth nightly. Indications: high cholesterol 30 Tab 11    telmisartan (MICARDIS) 80 mg tablet Take 1 Tab by mouth daily. 80 Tab 11    hydroCHLOROthiazide (HYDRODIURIL) 12.5 mg tablet Take 1 Tab by mouth daily. 90 Tab 3    cholecalciferol (VITAMIN D3) 1,000 unit cap Take 1,000 Units by mouth daily.  metFORMIN ER (GLUCOPHAGE XR) 500 mg tablet Take 1 Tab by mouth two (2) times daily (with meals).  180 Tab 3     Past Medical History:   Diagnosis Date    Diabetes ([address]     [facility] 2017    [facility]    Hypercholesterolemia     Hypertension      Past Surgical History:   Procedure Laterality Date    BREAST SURGERY PROCEDURE UNLISTED Right 1998    Status post breast biopsy--- benign    HX COLONOSCOPY  2015    normal--- [facility]     Allergies   Allergen Reactions    Lisinopril Swelling    Iodine Rash         REVIEW OF SYSTEMS:  General: negative for - chills or fever  ENT: negative for - headaches, nasal congestion or tinnitus  Respiratory: negative for - cough, hemoptysis, shortness of breath or wheezing  Cardiovascular : negative for - chest pain, edema, palpitations or shortness of breath  Gastrointestinal: negative for - abdominal pain, blood in stools, heartburn or nausea/vomiting  Genito-Urinary: no dysuria, trouble voiding, or hematuria  Musculoskeletal: negative for - gait disturbance, joint pain, joint stiffness or joint swelling  Neurological: no TIA or stroke symptoms  Hematologic: no bruises, no bleeding, no swollen glands  Integument: no lumps, mole changes, nail changes or rash  Endocrine: no malaise/lethargy or unexpected weight changes      Social History     Socioeconomic History    Marital status:      Spouse name: Not on file    Number of children: 0    Years of education: 12+    Highest education level: Not on file   Occupational History    Occupation: retired   Tobacco Use    Smoking status: Never Smoker    Smokeless tobacco: Never Used   Substance and Sexual Activity    Alcohol use: Yes     Comment: socially    Drug use: No    Sexual activity: Yes     Partners: Male     Family History   Problem Relation Age of Onset    Heart Disease Mother     Glaucoma Father        OBJECTIVE:    Visit Vitals  /70   Pulse 100   Temp 98.6 °F (37 °C) (Oral)   Resp 16   Ht 5' 5\" (1.651 m)   Wt 128 lb 3.2 oz (58.2 kg)   SpO2 98%   BMI 21.33 kg/m²     CONSTITUTIONAL: well , well nourished, appears age appropriate  EYES: perrla, eom intact  ENMT:moist mucous membranes, pharynx clear  NECK: supple.  Thyroid normal  RESPIRATORY: Chest: clear to ascultation and percussion,2/6 PJ heard best second interspace without radiation  CARDIOVASCULAR: Heart: regular rate and rhythm  GASTROINTESTINAL: Abdomen: soft, bowel sounds active  HEMATOLOGIC: no pathological lymph nodes palpated  MUSCULOSKELETAL: Extremities: no edema, pulse 1+   INTEGUMENT: No unusual rashes or suspicious skin lesions noted. Nails appear normal.  NEUROLOGIC: non-focal exam   MENTAL STATUS: alert and oriented, appropriate affect      ASSESSMENT:  1. Essential hypertension    2. Controlled type 2 diabetes mellitus without complication, without long-term current use of insulin ([facility] [address]    3. Dyslipidemia    4. Iron deficiency anemia due to chronic blood loss    5. Flow murmur        PLAN:    1. Her BP remains a bit elevated. When she takes it at home, systolics are generally in the 140 range, but not much lower. She is currently taking full doses of Amlodipine, Telmisartan and Hydrochlorothiazide. I am not comfortable with this trend. Will await till she returns and if her pressure remains the same, particularly home BP readings, then I will change the Hydrochlorothiazide to Chlorthalidone. She will be given KCl elixir. Her other antihypertensive medications will be continued. 2. Her diabetes historically has been doing quite well. 3. She will continue her statin as prescribed and efficacy and compliance will be assessed. 4. She has been anemic previously and this is iron deficient, culminating in the upper and lower endoscopy being performed approximately two years ago. It was the upper endoscopy that she had problems with and to this day has continued to complain of difficulty swallowing. Nothing was found at that workup. I will continue to monitor her CBCs. 5. She does have a flow murmur, predominantly in the aortic region. Previous echocardiogram did not reveal evidence of aortic stenosis, but apparently she does have a mitral valve prolapse.   I do not think this flow murmur is secondary to this, however. 6. She asked me about a whole body scan and I explained to her that this does not exist.  7. She needs to increase her physical activity, walking on a consistent basis. She has increased it most recently and this has improved her sense of wellbeing. .  Orders Placed This Encounter    HEMOGLOBIN A1C WITH EAG    APOLIPOPROTEIN B    METABOLIC PANEL, COMPREHENSIVE    CBC WITH AUTOMATED DIFF         Follow-up and Dispositions    · Return in about 3 months (around 4/16/2020).            Letha Lion MD

## 2020-07-02 ENCOUNTER — TELEPHONE (OUTPATIENT)
Dept: INTERNAL MEDICINE CLINIC | Age: 70
End: 2020-07-02

## 2020-07-02 NOTE — TELEPHONE ENCOUNTER
Received call from patient c/o dizzy spells, hot/cold flashes and chest pain since starting medication Amlodipine in November 2019. Verbal order per Dr. Margie Moe to stop Amlodipine for 2 weeks then resume and contact office if symptoms persist. Pt verbalized understanding.

## 2020-12-03 DIAGNOSIS — I10 ESSENTIAL HYPERTENSION: ICD-10-CM

## 2020-12-04 RX ORDER — AMLODIPINE BESYLATE 10 MG/1
10 TABLET ORAL DAILY
Qty: 30 TAB | Refills: 11 | Status: SHIPPED | OUTPATIENT
Start: 2020-12-04 | End: 2022-03-06

## 2021-01-07 ENCOUNTER — TELEPHONE (OUTPATIENT)
Dept: INTERNAL MEDICINE CLINIC | Age: 71
End: 2021-01-07

## 2021-01-07 NOTE — TELEPHONE ENCOUNTER
Left voicemail message informing patient to take 5 mg of amlodipine instead of 10 mg (if she only has 10 mg tabs, cut in half).

## 2021-01-11 RX ORDER — AMLODIPINE BESYLATE 5 MG/1
5 TABLET ORAL DAILY
Qty: 90 TAB | Refills: 3 | Status: SHIPPED | OUTPATIENT
Start: 2021-01-11 | End: 2022-03-06

## 2021-02-22 ENCOUNTER — TRANSCRIBE ORDER (OUTPATIENT)
Dept: SCHEDULING | Age: 71
End: 2021-02-22

## 2021-02-22 DIAGNOSIS — I27.20 PULMONARY HYPERTENSION (HCC): Primary | ICD-10-CM

## 2021-03-05 ENCOUNTER — TRANSCRIBE ORDER (OUTPATIENT)
Dept: REGISTRATION | Age: 71
End: 2021-03-05

## 2021-03-05 ENCOUNTER — HOSPITAL ENCOUNTER (OUTPATIENT)
Dept: CT IMAGING | Age: 71
Discharge: HOME | End: 2021-03-05
Payer: MEDICARE

## 2021-03-05 ENCOUNTER — HOSPITAL ENCOUNTER (OUTPATIENT)
Dept: VASCULAR SURGERY | Age: 71
Discharge: HOME | End: 2021-03-05
Payer: MEDICARE

## 2021-03-05 ENCOUNTER — HOSPITAL ENCOUNTER (OUTPATIENT)
Dept: LAB | Age: 71
Discharge: HOME | End: 2021-03-05
Payer: MEDICARE

## 2021-03-05 ENCOUNTER — TRANSCRIBE ORDER (OUTPATIENT)
Dept: CARDIAC REHAB | Age: 71
End: 2021-03-05

## 2021-03-05 DIAGNOSIS — M79.89 LEG SWELLING: ICD-10-CM

## 2021-03-05 DIAGNOSIS — I27.20 PROGRESSIVE PULMONARY HYPERTENSION (HCC): ICD-10-CM

## 2021-03-05 DIAGNOSIS — I27.20 PULMONARY HYPERTENSION (HCC): ICD-10-CM

## 2021-03-05 DIAGNOSIS — I27.20 PROGRESSIVE PULMONARY HYPERTENSION (HCC): Primary | ICD-10-CM

## 2021-03-05 DIAGNOSIS — M79.89 LEG SWELLING: Primary | ICD-10-CM

## 2021-03-05 PROCEDURE — 93970 EXTREMITY STUDY: CPT

## 2021-03-05 PROCEDURE — 71275 CT ANGIOGRAPHY CHEST: CPT

## 2021-03-05 PROCEDURE — 74011000636 HC RX REV CODE- 636: Performed by: INTERNAL MEDICINE

## 2021-03-05 RX ADMIN — IOPAMIDOL 100 ML: 755 INJECTION, SOLUTION INTRAVENOUS at 11:35

## 2021-03-08 ENCOUNTER — TRANSCRIBE ORDER (OUTPATIENT)
Dept: SCHEDULING | Age: 71
End: 2021-03-08

## 2021-03-08 DIAGNOSIS — R60.1 ANASARCA: Primary | ICD-10-CM

## 2021-03-09 ENCOUNTER — HOSPITAL ENCOUNTER (OUTPATIENT)
Dept: LAB | Age: 71
Discharge: HOME | End: 2021-03-09
Attending: NURSE PRACTITIONER
Payer: MEDICARE

## 2021-03-09 ENCOUNTER — TRANSCRIBE ORDER (OUTPATIENT)
Dept: REGISTRATION | Age: 71
End: 2021-03-09

## 2021-03-09 ENCOUNTER — HOSPITAL ENCOUNTER (OUTPATIENT)
Dept: CT IMAGING | Age: 71
Discharge: HOME | End: 2021-03-09
Attending: NURSE PRACTITIONER
Payer: MEDICARE

## 2021-03-09 DIAGNOSIS — R60.1 ANASARCA: ICD-10-CM

## 2021-03-09 DIAGNOSIS — R60.1 ANASARCA: Primary | ICD-10-CM

## 2021-03-09 LAB — CREAT SERPL-MCNC: 0.56 MG/DL (ref 0.55–1.02)

## 2021-03-09 PROCEDURE — 36415 COLL VENOUS BLD VENIPUNCTURE: CPT

## 2021-03-09 PROCEDURE — 82565 ASSAY OF CREATININE: CPT

## 2021-03-09 PROCEDURE — 74011000636 HC RX REV CODE- 636: Performed by: NURSE PRACTITIONER

## 2021-03-09 PROCEDURE — 74174 CTA ABD&PLVS W/CONTRAST: CPT

## 2021-03-09 RX ADMIN — IOPAMIDOL 100 ML: 755 INJECTION, SOLUTION INTRAVENOUS at 09:21

## 2021-03-10 ENCOUNTER — TRANSCRIBE ORDER (OUTPATIENT)
Dept: SCHEDULING | Age: 71
End: 2021-03-10

## 2021-03-10 DIAGNOSIS — I27.20 PULMONARY HYPERTENSION (HCC): Primary | ICD-10-CM

## 2021-03-12 ENCOUNTER — HOSPITAL ENCOUNTER (OUTPATIENT)
Dept: PREADMISSION TESTING | Age: 71
Discharge: HOME | End: 2021-03-12
Payer: MEDICARE

## 2021-03-12 LAB — SARS-COV-2, COV2: NORMAL

## 2021-03-12 PROCEDURE — U0003 INFECTIOUS AGENT DETECTION BY NUCLEIC ACID (DNA OR RNA); SEVERE ACUTE RESPIRATORY SYNDROME CORONAVIRUS 2 (SARS-COV-2) (CORONAVIRUS DISEASE [COVID-19]), AMPLIFIED PROBE TECHNIQUE, MAKING USE OF HIGH THROUGHPUT TECHNOLOGIES AS DESCRIBED BY CMS-2020-01-R: HCPCS

## 2021-03-13 LAB — SARS-COV-2, COV2NT: NOT DETECTED

## 2021-03-16 PROBLEM — I48.91 ATRIAL FIBRILLATION (HCC): Status: ACTIVE | Noted: 2021-03-16

## 2021-03-22 LAB — MAMMOGRAPHY, EXTERNAL: NORMAL

## 2021-03-29 ENCOUNTER — HOSPITAL ENCOUNTER (OUTPATIENT)
Dept: NUCLEAR MEDICINE | Age: 71
Discharge: HOME | End: 2021-03-29
Payer: MEDICARE

## 2021-03-29 DIAGNOSIS — I27.20 PULMONARY HYPERTENSION (HCC): ICD-10-CM

## 2021-03-29 PROCEDURE — A9540 TC99M MAA: HCPCS

## 2022-02-22 LAB — HBA1C MFR BLD HPLC: 6.1 %

## 2022-03-06 ENCOUNTER — HOSPITAL ENCOUNTER (EMERGENCY)
Age: 72
Discharge: HOME | End: 2022-03-06
Attending: EMERGENCY MEDICINE
Payer: MEDICARE

## 2022-03-06 VITALS
HEART RATE: 84 BPM | OXYGEN SATURATION: 99 % | SYSTOLIC BLOOD PRESSURE: 163 MMHG | BODY MASS INDEX: 20.38 KG/M2 | WEIGHT: 115 LBS | DIASTOLIC BLOOD PRESSURE: 75 MMHG | HEIGHT: 63 IN | TEMPERATURE: 98.5 F | RESPIRATION RATE: 18 BRPM

## 2022-03-06 DIAGNOSIS — I16.1 HYPERTENSIVE EMERGENCY: Primary | ICD-10-CM

## 2022-03-06 LAB
ANION GAP SERPL CALC-SCNC: 9 MMOL/L (ref 5–15)
BASOPHILS # BLD: 0.1 K/UL (ref 0–0.2)
BASOPHILS NFR BLD: 1 % (ref 0–2.5)
BUN SERPL-MCNC: 19 MG/DL (ref 6–20)
BUN/CREAT SERPL: 28 (ref 12–20)
CA-I BLD-MCNC: 9.8 MG/DL (ref 8.5–10.1)
CHLORIDE SERPL-SCNC: 103 MMOL/L (ref 97–108)
CO2 SERPL-SCNC: 29 MMOL/L (ref 21–32)
CREAT SERPL-MCNC: 0.68 MG/DL (ref 0.55–1.02)
EOSINOPHIL # BLD: 0.1 K/UL (ref 0–0.7)
EOSINOPHIL NFR BLD: 2 % (ref 0.9–2.9)
ERYTHROCYTE [DISTWIDTH] IN BLOOD BY AUTOMATED COUNT: 13.6 % (ref 11.5–14.5)
GLUCOSE SERPL-MCNC: 142 MG/DL (ref 65–100)
HCT VFR BLD AUTO: 39.8 % (ref 36–46)
HGB BLD-MCNC: 13.5 G/DL (ref 13.5–17.5)
LYMPHOCYTES # BLD: 2.1 K/UL (ref 1–4.8)
LYMPHOCYTES NFR BLD: 32 % (ref 20.5–51.1)
MAGNESIUM SERPL-MCNC: 2.3 MG/DL (ref 1.6–2.4)
MCH RBC QN AUTO: 30.6 PG (ref 31–34)
MCHC RBC AUTO-ENTMCNC: 33.8 G/DL (ref 31–36)
MCV RBC AUTO: 90.6 FL (ref 80–100)
MONOCYTES # BLD: 0.5 K/UL (ref 0.2–2.4)
MONOCYTES NFR BLD: 8 % (ref 1.7–9.3)
NEUTS SEG # BLD: 3.7 K/UL (ref 1.8–7.7)
NEUTS SEG NFR BLD: 57 % (ref 42–75)
NRBC # BLD: 0.02 K/UL
NRBC BLD-RTO: 0.3 PER 100 WBC
PLATELET # BLD AUTO: 251 K/UL (ref 150–400)
PMV BLD AUTO: 8.6 FL (ref 6.5–11.5)
POTASSIUM SERPL-SCNC: 4.1 MMOL/L (ref 3.5–5.1)
RBC # BLD AUTO: 4.39 M/UL (ref 4.5–5.9)
SODIUM SERPL-SCNC: 141 MMOL/L (ref 136–145)
TROPONIN-HIGH SENSITIVITY: 8 NG/L (ref 0–51)
WBC # BLD AUTO: 6.5 K/UL (ref 4.4–11.3)

## 2022-03-06 PROCEDURE — 36415 COLL VENOUS BLD VENIPUNCTURE: CPT

## 2022-03-06 PROCEDURE — 83735 ASSAY OF MAGNESIUM: CPT

## 2022-03-06 PROCEDURE — 96374 THER/PROPH/DIAG INJ IV PUSH: CPT

## 2022-03-06 PROCEDURE — 93005 ELECTROCARDIOGRAM TRACING: CPT

## 2022-03-06 PROCEDURE — 84484 ASSAY OF TROPONIN QUANT: CPT

## 2022-03-06 PROCEDURE — 74011250636 HC RX REV CODE- 250/636: Performed by: EMERGENCY MEDICINE

## 2022-03-06 PROCEDURE — 99284 EMERGENCY DEPT VISIT MOD MDM: CPT

## 2022-03-06 PROCEDURE — 85025 COMPLETE CBC W/AUTO DIFF WBC: CPT

## 2022-03-06 PROCEDURE — 80048 BASIC METABOLIC PNL TOTAL CA: CPT

## 2022-03-06 RX ORDER — FUROSEMIDE 20 MG/1
20 TABLET ORAL 2 TIMES DAILY
COMMUNITY

## 2022-03-06 RX ORDER — LABETALOL HCL 20 MG/4 ML
20 SYRINGE (ML) INTRAVENOUS
Status: COMPLETED | OUTPATIENT
Start: 2022-03-06 | End: 2022-03-06

## 2022-03-06 RX ADMIN — LABETALOL HYDROCHLORIDE 20 MG: 5 INJECTION, SOLUTION INTRAVENOUS at 01:28

## 2022-03-06 NOTE — ED NOTES
I have reviewed discharge instructions with the patient. The patient verbalized understanding. Discussed current home medications with patient and she verbalized that she has an appointment with her PCP on Friday 3/11/2022. Pt to continue monitoring blood pressure at home.

## 2022-03-06 NOTE — ED PROVIDER NOTES
EMERGENCY DEPARTMENT HISTORY AND PHYSICAL EXAM  ?    Date: 3/6/2022  Patient Name: Germaine Metz    History of Presenting Illness    Patient presents with:  Hypertension: Pt concerned as systolic blood pressure in 180's this evening      History Provided By: Patient    HPI: Germaine Metz, 70 y.o. female with a past medical history significant diabetes, hypertension and breast cancer  presents to the ED with cc of elevated blood pressure readings after a brief chest pain episode. Patient reports systolic blood pressure readings of 180s. She has taken her blood pressure medicine. She denies headache or neuro deficit. There are no other complaints, changes, or physical findings at this time. PCP: Marie Peters MD    Current Outpatient Medications:  furosemide (LASIX) 20 mg tablet, Take 20 mg by mouth two (2) times a day. Indications: high blood pressure, Disp: , Rfl:   rosuvastatin (CRESTOR) 5 mg tablet, Take 1 Tab by mouth nightly. Indications: high cholesterol, Disp: 30 Tab, Rfl: 11  telmisartan (MICARDIS) 80 mg tablet, Take 1 Tab by mouth daily. , Disp: 80 Tab, Rfl: 11  hydroCHLOROthiazide (HYDRODIURIL) 12.5 mg tablet, Take 1 Tab by mouth daily. , Disp: 90 Tab, Rfl: 3  metFORMIN ER (GLUCOPHAGE XR) 500 mg tablet, Take 1 Tab by mouth two (2) times daily (with meals). , Disp: 180 Tab, Rfl: 3  cholecalciferol (VITAMIN D3) 1,000 unit cap, Take 1,000 Units by mouth daily. , Disp: , Rfl:   pantoprazole (PROTONIX) 40 mg tablet, Take 1 Tab by mouth daily. Indications: gastroesophageal reflux disease, Disp: 30 Tab, Rfl: 11        Past History    Past Medical History:  Past Medical History:  07/06/2020: Breast cancer ([facility] [address]  No date: Diabetes ([facility] [address]  No date: Dysphagia  No date: History of chemotherapy  2017:  History of mammogram     Comment:  [facility]  No date: Hypercholesterolemia  No date: Hypertension  No date: Shortness of breath    Past Surgical History:  Past Surgical History:  2015: HX COLONOSCOPY     Comment:  normal--- [facility]  No date: HX MODIFIED RADICAL MASTECTOMY     Comment:  partial removal  1998: [place] BREAST SURGERY PROCEDURE UNLISTED; Right     Comment:  Status post breast biopsy--- benign    Family History:  Review of patient's family history indicates:  Problem: Heart Disease     Relation: Mother         Age of Onset: (Not Specified)  Problem: Glaucoma     Relation: Father         Age of Onset: (Not Specified)      Social History:  Social History   Tobacco Use     Smoking status: Never Smoker     Smokeless tobacco: Never Used   Alcohol use: Yes     Comment: socially; pt stated last drink was two years ago   Drug use: Never      Allergies:  -- Iodine -- Rash and Other (comments)   --  Facial swelling  -- Lisinopril -- Swelling  -- Shellfish Derived -- Angioedema      Review of Systems  [unfilled]    Physical Exam  [unfilled]    Diagnostic Study Results    Labs -  Recent Results (from the past 12 hour(s))  -CBC WITH AUTOMATED DIFF:   Collection Time: 03/06/22 12:43 AM      Result                      Value             Ref Range          WBC                         6.5               4.4 - 11.3 K*      RBC                         4.39 (L)          4.50 - 5.90 *      HGB                         13.5              13.5 - 17.5 *      HCT                         39.8              36 - 46 %          MCV                         90.6              80 - 100 FL        MCH                         30.6 (L)          31 - 34 PG         MCHC                        33.8              31.0 - 36.0 *      RDW                         13.6              11.5 - 14.5 %      PLATELET                    251               150 - 400 K/*      MPV                         8.6               6.5 - 11.5 FL      NRBC                        0.3                WBC        ABSOLUTE NRBC               0.02              K/uL               NEUTROPHILS                 57                42 - 75 % LYMPHOCYTES                 32                20.5 - 51.1 %      MONOCYTES                   8                 1.7 - 9.3 %        EOSINOPHILS                 2                 0.9 - 2.9 %        BASOPHILS                   1                 0.0 - 2.5 %        ABS. NEUTROPHILS            3.7               1.8 - 7.7 K/*      ABS. LYMPHOCYTES            2.1               1.0 - 4.8 K/*      ABS. MONOCYTES              0.5               0.2 - 2.4 K/*      ABS. EOSINOPHILS            0.1               0.0 - 0.7 K/*      ABS.  BASOPHILS              0.1               0.0 - 0.2 K/*  -METABOLIC PANEL, BASIC:   Collection Time: 03/06/22 12:43 AM      Result                      Value             Ref Range          Sodium                      141               136 - 145 mm*      Potassium                   4.1               3.5 - 5.1 mm*      Chloride                    103               97 - 108 mmo*      CO2                         29                21 - 32 mmol*      Anion gap                   9                 5 - 15 mmol/L      Glucose                     142 (H)           65 - 100 mg/*      BUN                         19                6 - 20 mg/dL       Creatinine                  0.68              0.55 - 1.02 *      BUN/Creatinine ratio        28 (H)            12 - 20            GFR est AA                  >60               >60 ml/min/1*      GFR est non-AA              >60               >60 ml/min/1*      Calcium                     9.8               8.5 - 10.1 m*  -MAGNESIUM:   Collection Time: 03/06/22 12:43 AM      Result                      Value             Ref Range          Magnesium                   2.3               1.6 - 2.4 mg*  -TROPONIN-HIGH SENSITIVITY:   Collection Time: 03/06/22 12:43 AM      Result                      Value             Ref Range          Troponin-High Sensitiv*     8                 0 - 51 ng/L      Radiologic Studies -   No orders to display  CT Results  (Last 48 hours)   None CXR Results  (Last 48 hours)   None       Medical Decision Making and ED Course  I am the first provider for this patient. I reviewed the vital signs, available nursing notes, past medical history, past surgical history, family history and social history. Vital Signs-Reviewed the patient's vital signs. Empty flowsheet group. EKG interpretation: (Preliminary)  Rhythm: normal sinus rhythm; and regular . Rate (approx.): 81; Axis: normal; MN interval: normal; QRS interval: normal ; ST/T wave: non-specific changes; Other findings: left ventricular hypertrophy. Records Reviewed: Nursing Notes    Provider Notes (Medical Decision Making):   Treat blood pressure with labetalol. The patient presents with differential diagnosis of hypertension, hypertensive emergency, anxiety. ED Course:   Initial assessment performed. The patients presenting problems have been discussed, and they are in agreement with the care plan formulated and outlined with them. I have encouraged them to ask questions as they arise throughout their visit. CRITICAL CARE NOTE :  1:33 AM  Amount of Critical Care Time: __30__(minutes)__    IMPENDING DETERIORATION -Cardiovascular  ASSOCIATED RISK FACTORS - Vascular Compromise  MANAGEMENT- Bedside Assessment and Supervision of Care  INTERPRETATION -  Blood Pressure  INTERVENTIONS - vascular control  CASE REVIEW -   TREATMENT RESPONSE -Resolved  PERFORMED BY - Self    NOTES   :  I have spent critical care time involved in lab review, consultations with specialist, family decision- making, bedside attention and documentation. This time excludes time spent in any separate billed procedures. During this entire length of time I was immediately available to the patient . Boston Ibrahim MD        Disposition    Discharged        DISCHARGE PLAN:  1.  Current Discharge Medication List    CONTINUE these medications which have NOT CHANGED    furosemide (LASIX) 20 mg tablet  Take 20 mg by mouth two (2) times a day. Indications: high blood pressure    rosuvastatin (CRESTOR) 5 mg tablet  Take 1 Tab by mouth nightly. Indications: high cholesterol  Qty: 30 Tab Refills: 11    telmisartan (MICARDIS) 80 mg tablet  Take 1 Tab by mouth daily. Qty: 80 Tab Refills: 11  Associated Diagnoses:Essential hypertension    hydroCHLOROthiazide (HYDRODIURIL) 12.5 mg tablet  Take 1 Tab by mouth daily. Qty: 90 Tab Refills: 3  Associated Diagnoses:Essential hypertension    metFORMIN ER (GLUCOPHAGE XR) 500 mg tablet  Take 1 Tab by mouth two (2) times daily (with meals). Qty: 180 Tab Refills: 3  Associated Diagnoses:Controlled type 2 diabetes mellitus without complication, without long-term current use of insulin (HCC)    cholecalciferol (VITAMIN D3) 1,000 unit cap  Take 1,000 Units by mouth daily. Associated Diagnoses:Essential hypertension    pantoprazole (PROTONIX) 40 mg tablet  Take 1 Tab by mouth daily. Indications: gastroesophageal reflux disease  Qty: 30 Tab Refills: 11        2. Follow-up Information    None    3. Return to ED if worse     Diagnosis    Clinical Impression: Hypertensive emergency  Attestations:    Lucero Cordova MD    Please note that this dictation was completed with [email or web address], the threadsy voice recognition software. Quite often unanticipated grammatical, syntax, homophones, and other interpretive errors are inadvertently transcribed by the computer software. Please disregard these errors. Please excuse any errors that have escaped final proofreading. Thank you.    ?            Past Medical History:   Diagnosis Date    Breast cancer ([facility] [address] 07/06/2020    Diabetes ([facility] [address]     Dysphagia     History of chemotherapy     [facility]    Hypercholesterolemia     Hypertension     Shortness of breath        Past Surgical History:   Procedure Laterality Date    HX COLONOSCOPY  2015    normal--- [facility]    HX MODIFIED RADICAL MASTECTOMY partial removal    [place] BREAST SURGERY PROCEDURE UNLISTED Right 1998    Status post breast biopsy--- benign         Family History:   Problem Relation Age of Onset    Heart Disease Mother     Glaucoma Father        Social History     Socioeconomic History    Marital status:      Spouse name: Not on file    Number of children: 0    Years of education: 12+    Highest education level: Not on file   Occupational History    Occupation: retired   Tobacco Use    Smoking status: Never Smoker    Smokeless tobacco: Never Used   Substance and Sexual Activity    Alcohol use: Yes     Comment: socially; pt stated last drink was two years ago    Drug use: Never    Sexual activity: Yes     Partners: Male   Other Topics Concern    Not on file   Social History Narrative    Not on file     Social Determinants of Health     Financial Resource Strain:     Difficulty of Paying Living Expenses: Not on file   Food Insecurity:     Worried About [address] [employer] in the Last Year: Not on file    [address] [employer] in the Last Year: Not on file   Transportation Needs:     Lack of Transportation (Medical): Not on file    Lack of Transportation (Non-Medical):  Not on file   Physical Activity:     Days of Exercise per Week: Not on file    Minutes of Exercise per Session: Not on file   Stress:     Feeling of Stress : Not on file   Social Connections:     Frequency of Communication with Friends and Family: Not on file    Frequency of Social Gatherings with Friends and Family: Not on file    Attends Latter-day Services: Not on file    Active Member of Clubs or Organizations: Not on file    Attends Club or Organization Meetings: Not on file    Marital Status: Not on file   Intimate Partner Violence:     Fear of Current or Ex-Partner: Not on file    Emotionally Abused: Not on file    Physically Abused: Not on file    Sexually Abused: Not on file   Housing Stability:     Unable to Pay for Housing in the Last Year: Not on file    Number of Places Lived in the Last Year: Not on file    Unstable Housing in the Last Year: Not on file         ALLERGIES: Iodine, Lisinopril, and Shellfish derived    Review of Systems   Constitutional: Negative. HENT: Negative. Eyes: Negative. Respiratory: Negative. Cardiovascular: Negative. Gastrointestinal: Negative. Endocrine: Negative. Genitourinary: Negative. Musculoskeletal: Negative. Neurological: Negative. Hematological: Negative. Psychiatric/Behavioral: Negative. Vitals:    03/06/22 0012 03/06/22 0128   BP: (!) 207/116 (!) 209/109   Pulse: (!) 108 99   Resp: 16    Temp: 98.5 °F (36.9 °C)    SpO2: 100%    Weight: 52.2 kg (115 lb)    Height: 5' 3\" (1.6 m)             Physical Exam  Vitals and nursing note reviewed. Constitutional:       Appearance: Normal appearance. HENT:      Head: Normocephalic and atraumatic. Right Ear: Tympanic membrane and ear canal normal.      Left Ear: Tympanic membrane and ear canal normal.      Nose: Nose normal.      Mouth/Throat:      Mouth: Mucous membranes are moist.      Pharynx: Oropharynx is clear. Eyes:      Extraocular Movements: Extraocular movements intact. Conjunctiva/sclera: Conjunctivae normal.      Pupils: Pupils are equal, round, and reactive to light. Cardiovascular:      Rate and Rhythm: Regular rhythm. Tachycardia present. Pulses: Normal pulses. Heart sounds: Murmur heard. Pulmonary:      Effort: Pulmonary effort is normal.      Breath sounds: Normal breath sounds. Abdominal:      General: Abdomen is flat. Bowel sounds are normal.      Palpations: Abdomen is soft. Musculoskeletal:         General: Normal range of motion. Cervical back: Normal range of motion and neck supple. Skin:     General: Skin is warm and dry. Neurological:      General: No focal deficit present. Mental Status: She is alert and oriented to person, place, and time.    Psychiatric: Mood and Affect: Mood normal.         Behavior: Behavior normal.          MDM       Procedures

## 2022-03-07 LAB
ATRIAL RATE: 82 BPM
CALCULATED P AXIS, ECG09: 41 DEGREES
CALCULATED R AXIS, ECG10: 32 DEGREES
CALCULATED T AXIS, ECG11: 89 DEGREES
DIAGNOSIS, 93000: NORMAL
P-R INTERVAL, ECG05: 159 MS
Q-T INTERVAL, ECG07: 364 MS
QRS DURATION, ECG06: 80 MS
QTC CALCULATION (BEZET), ECG08: 423 MS
VENTRICULAR RATE, ECG03: 81 BPM

## 2022-03-18 PROBLEM — K21.00 ESOPHAGITIS, REFLUX: Status: ACTIVE | Noted: 2019-08-11

## 2022-03-18 PROBLEM — I48.91 ATRIAL FIBRILLATION (HCC): Status: ACTIVE | Noted: 2021-03-16

## 2022-03-19 PROBLEM — I34.1 MITRAL VALVE PROLAPSE: Status: ACTIVE | Noted: 2018-03-16

## 2022-03-19 PROBLEM — E11.9 CONTROLLED TYPE 2 DIABETES MELLITUS WITHOUT COMPLICATION, WITHOUT LONG-TERM CURRENT USE OF INSULIN (HCC): Status: ACTIVE | Noted: 2018-03-16

## 2022-03-19 PROBLEM — I10 ESSENTIAL HYPERTENSION: Status: ACTIVE | Noted: 2018-03-16

## 2022-03-19 PROBLEM — D50.0 IRON DEFICIENCY ANEMIA DUE TO CHRONIC BLOOD LOSS: Status: ACTIVE | Noted: 2018-04-21

## 2022-03-20 PROBLEM — E78.5 DYSLIPIDEMIA: Status: ACTIVE | Noted: 2018-03-16

## 2022-03-20 PROBLEM — R63.4 WEIGHT LOSS: Status: ACTIVE | Noted: 2019-08-11

## 2023-05-24 RX ORDER — PANTOPRAZOLE SODIUM 40 MG/1
40 TABLET, DELAYED RELEASE ORAL DAILY
COMMUNITY
Start: 2019-11-22

## 2023-05-24 RX ORDER — ROSUVASTATIN CALCIUM 5 MG/1
5 TABLET, COATED ORAL
COMMUNITY
Start: 2019-11-20

## 2023-05-24 RX ORDER — TELMISARTAN 80 MG/1
80 TABLET ORAL DAILY
COMMUNITY
Start: 2019-08-11

## 2023-05-24 RX ORDER — HYDROCHLOROTHIAZIDE 12.5 MG/1
12.5 TABLET ORAL DAILY
COMMUNITY
Start: 2019-08-11

## 2023-05-24 RX ORDER — METFORMIN HYDROCHLORIDE 500 MG/1
500 TABLET, EXTENDED RELEASE ORAL 2 TIMES DAILY WITH MEALS
COMMUNITY
Start: 2019-06-28

## 2023-05-24 RX ORDER — FUROSEMIDE 20 MG/1
20 TABLET ORAL 2 TIMES DAILY
COMMUNITY

## 2023-11-01 NOTE — PROGRESS NOTES
Chief Complaint   Patient presents with    Hypertension     1 month follow up      1. Have you been to the ER, urgent care clinic since your last visit? Hospitalized since your last visit? No    2. Have you seen or consulted any other health care providers outside of the [address] [place] [address] since your last visit? Include any pap smears or colon screening.  No Initial Size Of Lesion: 0.3